# Patient Record
Sex: FEMALE | Race: WHITE | NOT HISPANIC OR LATINO | Employment: FULL TIME | URBAN - METROPOLITAN AREA
[De-identification: names, ages, dates, MRNs, and addresses within clinical notes are randomized per-mention and may not be internally consistent; named-entity substitution may affect disease eponyms.]

---

## 2023-04-21 ENCOUNTER — TELEPHONE (OUTPATIENT)
Dept: PRIMARY CARE | Facility: CLINIC | Age: 33
End: 2023-04-21

## 2023-04-28 ENCOUNTER — TELEMEDICINE (OUTPATIENT)
Dept: PRIMARY CARE | Facility: CLINIC | Age: 33
End: 2023-04-28
Payer: COMMERCIAL

## 2023-04-28 DIAGNOSIS — F32.1 CURRENT MODERATE EPISODE OF MAJOR DEPRESSIVE DISORDER, UNSPECIFIED WHETHER RECURRENT (MULTI): Primary | ICD-10-CM

## 2023-04-28 DIAGNOSIS — F41.1 GENERALIZED ANXIETY DISORDER: ICD-10-CM

## 2023-04-28 PROBLEM — J32.9 CHRONIC SINUSITIS: Status: ACTIVE | Noted: 2023-04-28

## 2023-04-28 PROCEDURE — 99213 OFFICE O/P EST LOW 20 MIN: CPT | Performed by: INTERNAL MEDICINE

## 2023-04-28 RX ORDER — BUPROPION HYDROCHLORIDE 300 MG/1
300 TABLET ORAL EVERY MORNING
Qty: 30 TABLET | Refills: 1 | Status: SHIPPED | OUTPATIENT
Start: 2023-04-28 | End: 2023-06-07 | Stop reason: SDUPTHER

## 2023-04-28 RX ORDER — LEVONORGESTREL 52 MG/1
INTRAUTERINE DEVICE INTRAUTERINE
COMMUNITY
Start: 2019-05-17 | End: 2023-10-06 | Stop reason: ALTCHOICE

## 2023-04-28 RX ORDER — BUPROPION HYDROCHLORIDE 150 MG/1
150 TABLET ORAL EVERY MORNING
Qty: 14 TABLET | Refills: 0 | Status: SHIPPED | OUTPATIENT
Start: 2023-04-28 | End: 2023-06-07 | Stop reason: ALTCHOICE

## 2023-04-28 ASSESSMENT — ENCOUNTER SYMPTOMS
FEVER: 0
PALPITATIONS: 0
DECREASED CONCENTRATION: 1
DIZZINESS: 0
HEADACHES: 0
ACTIVITY CHANGE: 0
CHEST TIGHTNESS: 0
AGITATION: 0
SLEEP DISTURBANCE: 1
FATIGUE: 1
DYSPHORIC MOOD: 1
COUGH: 0
SHORTNESS OF BREATH: 0
NERVOUS/ANXIOUS: 1

## 2023-04-28 NOTE — PROGRESS NOTES
Nathan Jolly requests an appointment today to discuss situational stress.  This has been conducted as a virtual video visit based on the corona virus pandemic.  An interactive audio and video telecommunications system which permits real-time communications between the patient (at home) and provider ([place] [facility] [place] was utilized to provide this telehealth service.  Verbal consent was requested and obtained from the patient at time of scheduling for a telehealth visit.      Ms. Jolly requests a virtual visit to discuss some situational stressors that have worsened.  This has been conducted as a virtual video visit based on the corona virus pandemic.  An interactive audio and video telecommunications system which permits real-time communications between the patient (at home) and provider ([place] [facility] [place] was utilized to provide this telehealth service.  Verbal consent was requested and obtained from the patient at time of scheduling for a telehealth visit.    Ms. Jolly requests a virtual visit today to discuss situational stressors that recently have been worsening.  She states that this comes from a combination of factors.  Pressures at work, some financial stress at home, all of this has been intensifying some.  In the past we had discussed possibly starting on medication therapy to help alleviate situational stressors but this was several months ago and we wanted to touch base just to ensure that we are on the same page of all options.  She has used Lexapro in the past and this has been helpful, but she did have some unwanted side effects, mainly trouble sleeping with troublesome dreams.  She knows that this is a possibility with Wellbutrin as well, but this is some that we have discussed considering at prior visits.        Review of Systems   Constitutional:  Positive for fatigue. Negative for activity change and fever.   Respiratory:  Negative for cough, chest tightness and shortness of breath.     Cardiovascular:  Negative for chest pain and palpitations.   Neurological:  Negative for dizziness and headaches.   Psychiatric/Behavioral:  Positive for decreased concentration, dysphoric mood and sleep disturbance. Negative for agitation, behavioral problems and suicidal ideas. The patient is nervous/anxious.        Objective   Physical Exam  Constitutional:       Appearance: Normal appearance.   Pulmonary:      Effort: Pulmonary effort is normal.   Neurological:      Mental Status: She is alert.   Psychiatric:         Mood and Affect: Mood normal.         Behavior: Behavior normal.         Thought Content: Thought content normal.         Judgment: Judgment normal.         Assessment/Plan   Situational stressors: Agree with starting on medications.  Again discussed different options, mutually deciding again to go with Wellbutrin starting at a 150 mg dose daily, escalating to 300 mg if tolerating well.  She will update us in about 4 to 6 weeks on how she is responding to therapy, and she plans to contact us with any questions or troubles tolerating this.  Problem List Items Addressed This Visit    None

## 2023-04-28 NOTE — PATIENT INSTRUCTIONS
We should plan to start with a low-dose of Wellbutrin/bupropion, 150 mg taken once daily for the initial 2 weeks, followed by an escalating dose to 300 mg daily which would be a maintenance dosing.  I ask that you give us an update on how you are responding to treatment.  If you have any questions regarding this therapy, any troubles tolerating this or unwanted side effects, or any additional concerns, please feel free to contact us.

## 2023-06-07 ENCOUNTER — TELEMEDICINE (OUTPATIENT)
Dept: PRIMARY CARE | Facility: CLINIC | Age: 33
End: 2023-06-07
Payer: COMMERCIAL

## 2023-06-07 DIAGNOSIS — F32.1 CURRENT MODERATE EPISODE OF MAJOR DEPRESSIVE DISORDER, UNSPECIFIED WHETHER RECURRENT (MULTI): Primary | ICD-10-CM

## 2023-06-07 DIAGNOSIS — F41.1 GENERALIZED ANXIETY DISORDER: ICD-10-CM

## 2023-06-07 PROCEDURE — 99213 OFFICE O/P EST LOW 20 MIN: CPT | Performed by: INTERNAL MEDICINE

## 2023-06-07 RX ORDER — BUPROPION HYDROCHLORIDE 300 MG/1
300 TABLET ORAL EVERY MORNING
Qty: 90 TABLET | Refills: 3 | Status: SHIPPED | OUTPATIENT
Start: 2023-06-07 | End: 2023-08-15 | Stop reason: SINTOL

## 2023-06-07 ASSESSMENT — ENCOUNTER SYMPTOMS
CHEST TIGHTNESS: 0
PALPITATIONS: 0
ABDOMINAL PAIN: 0
SHORTNESS OF BREATH: 0
NERVOUS/ANXIOUS: 0
HEADACHES: 0
FATIGUE: 0
NAUSEA: 0
COUGH: 0
ACTIVITY CHANGE: 0
DIZZINESS: 0
WHEEZING: 0
CONSTIPATION: 0
DYSPHORIC MOOD: 0
DIARRHEA: 0
FEVER: 0
SLEEP DISTURBANCE: 0
DECREASED CONCENTRATION: 0

## 2023-06-07 NOTE — PATIENT INSTRUCTIONS
I am glad to hear that you are responding well to the current medications.  We should continue on current dosing.  Refills have been sent to your local pharmacy.  If you have any questions or need additional refills, please let us know.  Otherwise, we are happy to see you back at the time of your routine wellness visit.

## 2023-06-07 NOTE — PROGRESS NOTES
Nathan Jolly has an appointment for a follow up.  This has been conducted as a virtual video visit based on the corona virus pandemic.  An interactive audio and video telecommunications system which permits real-time communications between the patient (at home) and provider (at [facility] [place] [facility]) was utilized to provide this telehealth service.  Verbal consent was requested and obtained from the patient at time of scheduling for a telehealth visit.    Ms. Jolly has a follow-up appointment of medication initiation.  This has been conducted as a virtual video visit based on the corona virus pandemic.  An interactive audio and video telecommunications system which permits real-time communications between the patient (at home) and provider (at [facility] [place] [facility]) was utilized to provide this telehealth service.  Verbal consent was requested and obtained from the patient at time of scheduling for a telehealth visit.    Ms. Jolly has a virtual follow-up appointment of her Wellbutrin initiation.  She finds that this has been quite helpful.  She is tolerating this well.  She has recently increased to the 300 mg dosing and again is tolerating this well.  She has had no troubles with her sleep patterns.  She feels as though the situational stressors are improving and her depression/anxiety is responding well.  She denies any complications with this and would like to continue on current dosing, feeling well overall.        Review of Systems   Constitutional:  Negative for activity change, fatigue and fever.   Respiratory:  Negative for cough, chest tightness, shortness of breath and wheezing.    Cardiovascular:  Negative for chest pain, palpitations and leg swelling.   Gastrointestinal:  Negative for abdominal pain, constipation, diarrhea and nausea.   Neurological:  Negative for dizziness and headaches.   Psychiatric/Behavioral:  Negative for behavioral problems, decreased concentration, dysphoric mood and sleep disturbance. The  patient is not nervous/anxious.        Objective   Physical Exam  Constitutional:       Appearance: Normal appearance.   Pulmonary:      Effort: Pulmonary effort is normal.   Neurological:      Mental Status: She is alert.   Psychiatric:         Mood and Affect: Mood normal.         Behavior: Behavior normal.         Thought Content: Thought content normal.         Judgment: Judgment normal.         Assessment/Plan   1.  Depression with anxiety: Doing well with Wellbutrin 300 mg daily.  Continue on current dosing.  Tolerating well and symptomatically improving.  Refill sent to local pharmacy.  Contact us with any questions or need for additional refills.  See her back as regularly scheduled for her routine wellness visits.    Problem List Items Addressed This Visit       Current moderate episode of major depressive disorder (CMS/HCC) - Primary    Generalized anxiety disorder

## 2023-08-15 ENCOUNTER — OFFICE VISIT (OUTPATIENT)
Dept: PRIMARY CARE | Facility: CLINIC | Age: 33
End: 2023-08-15
Payer: COMMERCIAL

## 2023-08-15 VITALS
DIASTOLIC BLOOD PRESSURE: 67 MMHG | WEIGHT: 215 LBS | BODY MASS INDEX: 40.62 KG/M2 | HEART RATE: 79 BPM | SYSTOLIC BLOOD PRESSURE: 116 MMHG

## 2023-08-15 DIAGNOSIS — F32.1 CURRENT MODERATE EPISODE OF MAJOR DEPRESSIVE DISORDER, UNSPECIFIED WHETHER RECURRENT (MULTI): Primary | ICD-10-CM

## 2023-08-15 DIAGNOSIS — E66.01 CLASS 3 SEVERE OBESITY WITHOUT SERIOUS COMORBIDITY WITH BODY MASS INDEX (BMI) OF 40.0 TO 44.9 IN ADULT, UNSPECIFIED OBESITY TYPE (MULTI): ICD-10-CM

## 2023-08-15 DIAGNOSIS — F41.1 GENERALIZED ANXIETY DISORDER: ICD-10-CM

## 2023-08-15 DIAGNOSIS — R42 VERTIGO: ICD-10-CM

## 2023-08-15 PROBLEM — E66.813 CLASS 3 SEVERE OBESITY WITHOUT SERIOUS COMORBIDITY WITH BODY MASS INDEX (BMI) OF 40.0 TO 44.9 IN ADULT: Status: ACTIVE | Noted: 2023-08-15

## 2023-08-15 PROCEDURE — 1036F TOBACCO NON-USER: CPT | Performed by: INTERNAL MEDICINE

## 2023-08-15 PROCEDURE — 99214 OFFICE O/P EST MOD 30 MIN: CPT | Performed by: INTERNAL MEDICINE

## 2023-08-15 PROCEDURE — 3008F BODY MASS INDEX DOCD: CPT | Performed by: INTERNAL MEDICINE

## 2023-08-15 RX ORDER — SERTRALINE HYDROCHLORIDE 50 MG/1
50 TABLET, FILM COATED ORAL DAILY
Qty: 30 TABLET | Refills: 1 | Status: SHIPPED | OUTPATIENT
Start: 2023-08-15 | End: 2023-09-07 | Stop reason: SDUPTHER

## 2023-08-15 ASSESSMENT — ENCOUNTER SYMPTOMS
DIARRHEA: 0
SHORTNESS OF BREATH: 0
NAUSEA: 0
WHEEZING: 0
DYSPHORIC MOOD: 1
FATIGUE: 0
APPETITE CHANGE: 0
CHEST TIGHTNESS: 0
FEVER: 0
CONSTIPATION: 0
HYPERACTIVE: 0
ACTIVITY CHANGE: 0
WEAKNESS: 0
ABDOMINAL PAIN: 0
PALPITATIONS: 0
DIZZINESS: 1
HEADACHES: 0
COUGH: 0
LIGHT-HEADEDNESS: 0

## 2023-08-15 NOTE — PATIENT INSTRUCTIONS
We will start on sertraline 50 mg tablets.  I would recommend starting with half tablet daily, best taken with food.  After about 1 week if you are tolerating this well you can increase to a full tablet daily.  I would recommend coming in for a comprehensive physical in about 4 to 6 weeks.  At that time we will plan on doing fasting blood work as well is following up on how you are doing with your new medication therapy.  If you have any questions prior to that time, please contact us.

## 2023-08-15 NOTE — PROGRESS NOTES
Nathan Jolly comes in today for a comprehensive follow up.      Nathan comes in today for comprehensive follow-up.  She states that about 1 month ago, she had an intense bout of vertigo.  This resolved quite quickly.  However, she was concerned that this could be a side effect of her Wellbutrin, and she also noticed that she was having some insomnia, only sleeping about 4 or 5 hours per night as well as headaches.  She tapered herself off of the Wellbutrin and now is not taking anything for this, she is concerned about this and would like to start on something else.  She has used Lexapro many years ago which did cause vivid dreams so she discontinued this.  She is struggling with her weight.  She states that this seems to worsen around the time of her menstrual cycle.  She denies any headaches, chest pain or palpitations.        Review of Systems   Constitutional:  Negative for activity change, appetite change, fatigue and fever.   Respiratory:  Negative for cough, chest tightness, shortness of breath and wheezing.    Cardiovascular:  Negative for chest pain, palpitations and leg swelling.   Gastrointestinal:  Negative for abdominal pain, constipation, diarrhea and nausea.   Neurological:  Positive for dizziness (Now resolved). Negative for weakness, light-headedness and headaches.   Psychiatric/Behavioral:  Positive for dysphoric mood. The patient is not hyperactive.        Objective   Physical Exam  Constitutional:       General: She is not in acute distress.     Appearance: She is obese.   HENT:      Right Ear: Tympanic membrane, ear canal and external ear normal. There is no impacted cerumen.      Left Ear: Tympanic membrane and ear canal normal. There is no impacted cerumen.   Neck:      Vascular: No carotid bruit.   Cardiovascular:      Rate and Rhythm: Normal rate and regular rhythm.      Pulses: Normal pulses.      Heart sounds: No murmur heard.     No gallop.   Pulmonary:      Effort: Pulmonary effort is  normal.   Neurological:      Mental Status: She is alert.         Assessment/Plan   1.  Vertigo: This has thankfully resolved.  Exam reassuring.  Contact us if any recurrences.  2.  Depression with anxiety: Did not tolerate Wellbutrin because of insomnia.  We have discussed different options, she is willing to try different SSRI therapy, we will start with sertraline 50 mg tablets, starting with 1/2 tablet daily, increasing to a full tablet daily after about 1 week.  We will follow-up in 4 to 6 weeks to see how she is faring and she can contact us if she has any troubles tolerating this.  3.  Severe obesity: Have discussed.  We will see if her PMS symptoms are managed better with her sertraline therapy.  We will see her back in 4 to 6 weeks and at that time have this scheduled as a comprehensive physical.  We will update comprehensive blood work to see if any medication injectable treatments would be appropriate or options.  She should in the meantime concentrate on dietary adjustments and increasing exercise habits.    Again, we will see her back in 4 to 6 weeks.  She is to contact us with any questions.  Problem List Items Addressed This Visit    None

## 2023-08-16 ENCOUNTER — TELEPHONE (OUTPATIENT)
Dept: PRIMARY CARE | Facility: CLINIC | Age: 33
End: 2023-08-16

## 2023-08-17 NOTE — TELEPHONE ENCOUNTER
Patient notified, states she noticed after she spoke with me she did take an whole dose forgetting to start with 1/2. She will start with half an tablet and see how that goes. If not she will give us an call back. Thanks

## 2023-09-07 DIAGNOSIS — F32.1 CURRENT MODERATE EPISODE OF MAJOR DEPRESSIVE DISORDER, UNSPECIFIED WHETHER RECURRENT (MULTI): ICD-10-CM

## 2023-09-07 DIAGNOSIS — F41.1 GENERALIZED ANXIETY DISORDER: ICD-10-CM

## 2023-09-07 RX ORDER — SERTRALINE HYDROCHLORIDE 50 MG/1
50 TABLET, FILM COATED ORAL DAILY
Qty: 90 TABLET | Refills: 0 | Status: SHIPPED | OUTPATIENT
Start: 2023-09-07 | End: 2023-10-06 | Stop reason: SINTOL

## 2023-10-05 PROBLEM — L30.9 DERMATITIS, UNSPECIFIED: Status: ACTIVE | Noted: 2020-02-20

## 2023-10-05 PROBLEM — L03.032 CELLULITIS OF LEFT TOE: Status: ACTIVE | Noted: 2020-02-20

## 2023-10-05 PROBLEM — E66.812 CLASS 2 SEVERE OBESITY WITH SERIOUS COMORBIDITY AND BODY MASS INDEX (BMI) OF 39.0 TO 39.9 IN ADULT: Status: ACTIVE | Noted: 2023-10-05

## 2023-10-05 PROBLEM — E66.01 CLASS 2 SEVERE OBESITY WITH SERIOUS COMORBIDITY AND BODY MASS INDEX (BMI) OF 39.0 TO 39.9 IN ADULT (MULTI): Status: ACTIVE | Noted: 2023-10-05

## 2023-10-05 RX ORDER — NORETHINDRONE AND ETHINYL ESTRADIOL 1 MG-35MCG
1 KIT ORAL DAILY
COMMUNITY
End: 2023-10-06 | Stop reason: SDUPTHER

## 2023-10-05 RX ORDER — TRIAMCINOLONE ACETONIDE 1 MG/G
1 CREAM TOPICAL
COMMUNITY
Start: 2020-02-20 | End: 2023-10-06 | Stop reason: ALTCHOICE

## 2023-10-05 RX ORDER — NORETHINDRONE AND ETHINYL ESTRADIOL 1 MG-35MCG
1 KIT ORAL DAILY
COMMUNITY
End: 2023-10-06 | Stop reason: ALTCHOICE

## 2023-10-05 RX ORDER — MUPIROCIN 20 MG/G
1 OINTMENT TOPICAL
COMMUNITY
Start: 2020-02-20 | End: 2023-10-06 | Stop reason: ALTCHOICE

## 2023-10-05 RX ORDER — KETOCONAZOLE 20 MG/G
1 CREAM TOPICAL
COMMUNITY
Start: 2020-02-20 | End: 2023-10-06 | Stop reason: ALTCHOICE

## 2023-10-06 ENCOUNTER — PROCEDURE VISIT (OUTPATIENT)
Dept: OBSTETRICS AND GYNECOLOGY | Facility: CLINIC | Age: 33
End: 2023-10-06
Payer: COMMERCIAL

## 2023-10-06 ENCOUNTER — OFFICE VISIT (OUTPATIENT)
Dept: PRIMARY CARE | Facility: CLINIC | Age: 33
End: 2023-10-06
Payer: COMMERCIAL

## 2023-10-06 VITALS
HEART RATE: 75 BPM | WEIGHT: 214.4 LBS | SYSTOLIC BLOOD PRESSURE: 142 MMHG | BODY MASS INDEX: 40.48 KG/M2 | DIASTOLIC BLOOD PRESSURE: 82 MMHG | HEIGHT: 61 IN

## 2023-10-06 VITALS
WEIGHT: 215 LBS | HEIGHT: 61 IN | BODY MASS INDEX: 40.59 KG/M2 | DIASTOLIC BLOOD PRESSURE: 80 MMHG | SYSTOLIC BLOOD PRESSURE: 126 MMHG

## 2023-10-06 DIAGNOSIS — Z23 NEED FOR INFLUENZA VACCINATION: ICD-10-CM

## 2023-10-06 DIAGNOSIS — Z00.00 ROUTINE GENERAL MEDICAL EXAMINATION AT A HEALTH CARE FACILITY: Primary | ICD-10-CM

## 2023-10-06 DIAGNOSIS — Z01.419 ENCNTR FOR GYN EXAM (GENERAL) (ROUTINE) W/O ABN FINDINGS: Primary | ICD-10-CM

## 2023-10-06 DIAGNOSIS — Z12.4 SCREENING FOR MALIGNANT NEOPLASM OF CERVIX: ICD-10-CM

## 2023-10-06 DIAGNOSIS — Z30.432 ENCOUNTER FOR IUD REMOVAL: ICD-10-CM

## 2023-10-06 PROBLEM — L30.9 DERMATITIS, UNSPECIFIED: Status: RESOLVED | Noted: 2020-02-20 | Resolved: 2023-10-06

## 2023-10-06 PROBLEM — L03.032 CELLULITIS OF LEFT TOE: Status: RESOLVED | Noted: 2020-02-20 | Resolved: 2023-10-06

## 2023-10-06 PROBLEM — E66.01 CLASS 2 SEVERE OBESITY WITH SERIOUS COMORBIDITY AND BODY MASS INDEX (BMI) OF 39.0 TO 39.9 IN ADULT (MULTI): Status: RESOLVED | Noted: 2023-10-05 | Resolved: 2023-10-06

## 2023-10-06 PROBLEM — E66.812 CLASS 2 SEVERE OBESITY WITH SERIOUS COMORBIDITY AND BODY MASS INDEX (BMI) OF 39.0 TO 39.9 IN ADULT: Status: RESOLVED | Noted: 2023-10-05 | Resolved: 2023-10-06

## 2023-10-06 LAB
25(OH)D3 SERPL-MCNC: 27 NG/ML (ref 30–100)
ALBUMIN SERPL BCP-MCNC: 4.1 G/DL (ref 3.4–5)
ALP SERPL-CCNC: 48 U/L (ref 33–110)
ALT SERPL W P-5'-P-CCNC: 17 U/L (ref 7–45)
ANION GAP SERPL CALC-SCNC: 14 MMOL/L (ref 10–20)
AST SERPL W P-5'-P-CCNC: 16 U/L (ref 9–39)
BASOPHILS # BLD AUTO: 0.02 X10*3/UL (ref 0–0.1)
BASOPHILS NFR BLD AUTO: 0.3 %
BILIRUB SERPL-MCNC: 0.3 MG/DL (ref 0–1.2)
BUN SERPL-MCNC: 11 MG/DL (ref 6–23)
CALCIUM SERPL-MCNC: 9.2 MG/DL (ref 8.6–10.6)
CHLORIDE SERPL-SCNC: 108 MMOL/L (ref 98–107)
CHOLEST SERPL-MCNC: 188 MG/DL (ref 0–199)
CHOLESTEROL/HDL RATIO: 3.9
CO2 SERPL-SCNC: 24 MMOL/L (ref 21–32)
CREAT SERPL-MCNC: 0.77 MG/DL (ref 0.5–1.05)
EOSINOPHIL # BLD AUTO: 0.16 X10*3/UL (ref 0–0.7)
EOSINOPHIL NFR BLD AUTO: 2.8 %
ERYTHROCYTE [DISTWIDTH] IN BLOOD BY AUTOMATED COUNT: 12.1 % (ref 11.5–14.5)
EST. AVERAGE GLUCOSE BLD GHB EST-MCNC: 97 MG/DL
GFR SERPL CREATININE-BSD FRML MDRD: >90 ML/MIN/1.73M*2
GLUCOSE SERPL-MCNC: 87 MG/DL (ref 74–99)
HBA1C MFR BLD: 5 %
HCT VFR BLD AUTO: 42.5 % (ref 36–46)
HDLC SERPL-MCNC: 48.5 MG/DL
HGB BLD-MCNC: 13.3 G/DL (ref 12–16)
IMM GRANULOCYTES # BLD AUTO: 0.01 X10*3/UL (ref 0–0.7)
IMM GRANULOCYTES NFR BLD AUTO: 0.2 % (ref 0–0.9)
IRON SATN MFR SERPL: 16 % (ref 25–45)
IRON SERPL-MCNC: 58 UG/DL (ref 35–150)
LDLC SERPL CALC-MCNC: 117 MG/DL (ref 130–180)
LYMPHOCYTES # BLD AUTO: 2.35 X10*3/UL (ref 1.2–4.8)
LYMPHOCYTES NFR BLD AUTO: 41.1 %
MCH RBC QN AUTO: 27.7 PG (ref 26–34)
MCHC RBC AUTO-ENTMCNC: 31.3 G/DL (ref 32–36)
MCV RBC AUTO: 88 FL (ref 80–100)
MONOCYTES # BLD AUTO: 0.36 X10*3/UL (ref 0.1–1)
MONOCYTES NFR BLD AUTO: 6.3 %
NEUTROPHILS # BLD AUTO: 2.82 X10*3/UL (ref 1.2–7.7)
NEUTROPHILS NFR BLD AUTO: 49.3 %
NON HDL CHOLESTEROL: 140 MG/DL (ref 0–149)
NRBC BLD-RTO: 0 /100 WBCS (ref 0–0)
PLATELET # BLD AUTO: 283 X10*3/UL (ref 150–450)
PMV BLD AUTO: 11 FL (ref 7.5–11.5)
POTASSIUM SERPL-SCNC: 4.4 MMOL/L (ref 3.5–5.3)
PROT SERPL-MCNC: 6.8 G/DL (ref 6.4–8.2)
RBC # BLD AUTO: 4.81 X10*6/UL (ref 4–5.2)
SODIUM SERPL-SCNC: 142 MMOL/L (ref 136–145)
TIBC SERPL-MCNC: 356 UG/DL (ref 240–445)
TRIGL SERPL-MCNC: 115 MG/DL (ref 0–149)
TSH SERPL-ACNC: 1.33 MIU/L (ref 0.44–3.98)
UIBC SERPL-MCNC: 298 UG/DL (ref 110–370)
VIT B12 SERPL-MCNC: 710 PG/ML (ref 211–911)
VLDL: 23 MG/DL (ref 0–40)
WBC # BLD AUTO: 5.7 X10*3/UL (ref 4.4–11.3)

## 2023-10-06 PROCEDURE — 99395 PREV VISIT EST AGE 18-39: CPT | Performed by: INTERNAL MEDICINE

## 2023-10-06 PROCEDURE — 83550 IRON BINDING TEST: CPT

## 2023-10-06 PROCEDURE — 90471 IMMUNIZATION ADMIN: CPT | Performed by: INTERNAL MEDICINE

## 2023-10-06 PROCEDURE — 1036F TOBACCO NON-USER: CPT | Performed by: INTERNAL MEDICINE

## 2023-10-06 PROCEDURE — 36415 COLL VENOUS BLD VENIPUNCTURE: CPT

## 2023-10-06 PROCEDURE — 83036 HEMOGLOBIN GLYCOSYLATED A1C: CPT

## 2023-10-06 PROCEDURE — 80053 COMPREHEN METABOLIC PANEL: CPT

## 2023-10-06 PROCEDURE — 88142 CYTOPATH C/V THIN LAYER: CPT

## 2023-10-06 PROCEDURE — 85025 COMPLETE CBC W/AUTO DIFF WBC: CPT

## 2023-10-06 PROCEDURE — 82306 VITAMIN D 25 HYDROXY: CPT

## 2023-10-06 PROCEDURE — 82607 VITAMIN B-12: CPT

## 2023-10-06 PROCEDURE — 87624 HPV HI-RISK TYP POOLED RSLT: CPT

## 2023-10-06 PROCEDURE — 83540 ASSAY OF IRON: CPT

## 2023-10-06 PROCEDURE — 99395 PREV VISIT EST AGE 18-39: CPT | Performed by: NURSE PRACTITIONER

## 2023-10-06 PROCEDURE — 3008F BODY MASS INDEX DOCD: CPT | Performed by: INTERNAL MEDICINE

## 2023-10-06 PROCEDURE — 84443 ASSAY THYROID STIM HORMONE: CPT

## 2023-10-06 PROCEDURE — 58301 REMOVE INTRAUTERINE DEVICE: CPT | Performed by: NURSE PRACTITIONER

## 2023-10-06 PROCEDURE — 80061 LIPID PANEL: CPT

## 2023-10-06 PROCEDURE — 90686 IIV4 VACC NO PRSV 0.5 ML IM: CPT | Performed by: INTERNAL MEDICINE

## 2023-10-06 ASSESSMENT — ENCOUNTER SYMPTOMS
FREQUENCY: 0
NECK STIFFNESS: 0
LIGHT-HEADEDNESS: 0
ARTHRALGIAS: 0
FLANK PAIN: 0
EYE ITCHING: 0
EYE DISCHARGE: 0
SINUS PAIN: 0
ABDOMINAL DISTENTION: 0
CONSTIPATION: 0
DIZZINESS: 0
DECREASED CONCENTRATION: 0
FEVER: 0
BRUISES/BLEEDS EASILY: 0
ACTIVITY CHANGE: 0
ABDOMINAL PAIN: 0
ADENOPATHY: 0
BACK PAIN: 0
FATIGUE: 1
COUGH: 0
POLYPHAGIA: 0
NECK PAIN: 0
CHILLS: 0
VOMITING: 0
VOICE CHANGE: 0
UNEXPECTED WEIGHT CHANGE: 0
DYSPHORIC MOOD: 1
DIARRHEA: 0
MYALGIAS: 0
NAUSEA: 0
NUMBNESS: 0
DYSURIA: 0
RHINORRHEA: 0
CHEST TIGHTNESS: 0
HEADACHES: 0
SLEEP DISTURBANCE: 0
HYPERACTIVE: 0
PALPITATIONS: 0
WEAKNESS: 0
SINUS PRESSURE: 0
WHEEZING: 0
COLOR CHANGE: 0
SHORTNESS OF BREATH: 0
SORE THROAT: 0

## 2023-10-06 NOTE — PROGRESS NOTES
"Nathan Jolly is a 32 y.o. who presents today for her annual gynecologic exam without complaints    The patient is sexually active. With   Concerns with intercourse:  no   Current contraception: IUD    Last pap:   2018 Normal HPV Negative  History of abnormal pap: no  HPV vaccine:   Last mammogram: Never  Colon screen: Never    Pregnancy hx:    OB History          3    Para   3    Term   3       0    AB   0    Living   3         SAB   0    IAB   0    Ectopic   0    Multiple   0    Live Births   3                  History of STIs: no    Patient concern for STI: no    Family history of breast, uterine, ovarian or colon cancer: no     Past medical, surgical, family and social histories reviewed and updated as needed.      /80   Ht 1.549 m (5' 1\")   Wt 97.5 kg (215 lb)   LMP 2023 (Exact Date)   BMI 40.62 kg/m²      Physical Exam  Constitutional:       Appearance: Normal appearance.   Genitourinary:      Bladder and rectum normal.      Right Labia: No skin changes or Bartholin's cyst.     Left Labia: No skin changes or Bartholin's cyst.     Vulva exam comments: Normal.      No vaginal prolapse present.     No vaginal atrophy present.     Vaginal exam comments: Normal.      No cervical motion tenderness.      IUD strings visualized.      Cervical exam comments: Normal.   Breasts:     Breasts are soft.     Right: Normal.      Left: Normal.   HENT:      Head: Normocephalic.   Pulmonary:      Effort: Pulmonary effort is normal.   Abdominal:      General: There is no distension.      Palpations: Abdomen is soft.   Musculoskeletal:         General: Normal range of motion.      Cervical back: Normal range of motion and neck supple.   Neurological:      General: No focal deficit present.      Mental Status: She is alert and oriented to person, place, and time.   Skin:     General: Skin is warm and dry.   Psychiatric:         Mood and Affect: Mood normal.         Behavior: Behavior normal.    "      Thought Content: Thought content normal.         Judgment: Judgment normal.   Vitals and nursing note reviewed.        Patient ID: Nathan Jolly is a 32 y.o. female.    IUD Removal    Date/Time: 10/6/2023 11:21 AM    Performed by: JOSE C Groves  Authorized by: JOSE C Groves    Consent:     Consent obtained:  Written    Consent given by:  Patient    Procedure risks and benefits discussed: yes      Patient questions answered: yes      Patient agrees, verbalizes understanding, and wants to proceed: yes      Educational handouts given: yes      Instructions and paperwork completed: yes    Universal protocol:     Patient states understanding of procedure being performed: yes      Relevant documents present and verified: yes      Test results available and properly labeled: yes      Imaging studies available: yes      Required blood products, implants, devices, and special equipment available: yes      Site marked: yes    Procedure:     Removed with no complications: yes      Removal due to mechanical complications of IUD: no      Removal due to infection and inflammatory reaction: no      Other reason for removal:  Desires pregnancy      Diagnoses and all orders for this visit:  Encntr for gyn exam (general) (routine) w/o abn findings  Encounter for IUD removal  -     IUD Removal  Screening for malignant neoplasm of cervix  -     THINPREP PAP

## 2023-10-06 NOTE — PROGRESS NOTES
Nathan Jolly comes in today for a comprehensive physical exam.      Ms Jolly comes in today for a comprehensive physical exam.  Unfortunately, she was not tolerating her sertraline therapy, so has discontinued this as well.  She has tried and failed SSRIs, Wellbutrin, and understands that possibly setting up with a mental health provider would be the next best step for further medication options.  She is following with her gynecologist later today and would like her IUD removed as she believes maybe this is causing some of her symptoms.  She has tried to eliminate sugars and carbohydrates over the past week, she is frustrated because she has not lost weight yet.  She admits that she does not exercise, preferring to lay in bed and play on her phone.  She does admit that life is quite hectic and she does feel tired, not motivated to exercise.  She denies any specific concerns of headaches, dizziness, chest pain, palpitations, shortness of breath nor cough, nausea, vomiting, nor changes in bowel or bladder habits.  She is amenable to updating comprehensive lab work today.        Review of Systems   Constitutional:  Positive for fatigue. Negative for activity change, chills, fever and unexpected weight change.   HENT:  Negative for congestion, ear pain, hearing loss, postnasal drip, rhinorrhea, sinus pressure, sinus pain, sore throat, tinnitus and voice change.    Eyes:  Negative for discharge, itching and visual disturbance.   Respiratory:  Negative for cough, chest tightness, shortness of breath and wheezing.    Cardiovascular:  Negative for chest pain, palpitations and leg swelling.   Gastrointestinal:  Negative for abdominal distention, abdominal pain, constipation, diarrhea, nausea and vomiting.   Endocrine: Negative for cold intolerance, polyphagia and polyuria.   Genitourinary:  Negative for dysuria, flank pain, frequency, menstrual problem, urgency and vaginal discharge.   Musculoskeletal:  Negative for  arthralgias, back pain, gait problem, myalgias, neck pain and neck stiffness.   Skin:  Negative for color change, pallor and rash.   Allergic/Immunologic: Negative for environmental allergies, food allergies and immunocompromised state.   Neurological:  Negative for dizziness, syncope, weakness, light-headedness, numbness and headaches.   Hematological:  Negative for adenopathy. Does not bruise/bleed easily.   Psychiatric/Behavioral:  Positive for dysphoric mood. Negative for behavioral problems, decreased concentration and sleep disturbance. The patient is not hyperactive.        Objective   Physical Exam  Constitutional:       General: She is not in acute distress.     Appearance: Normal appearance. She is well-developed. She is obese. She is not ill-appearing.   HENT:      Head: Normocephalic.      Right Ear: Tympanic membrane, ear canal and external ear normal. There is no impacted cerumen.      Left Ear: Tympanic membrane, ear canal and external ear normal. There is no impacted cerumen.      Nose: Nose normal.      Mouth/Throat:      Mouth: Mucous membranes are moist.      Pharynx: Oropharynx is clear. No oropharyngeal exudate or posterior oropharyngeal erythema.   Eyes:      General: Lids are normal. No scleral icterus.     Extraocular Movements: Extraocular movements intact.      Conjunctiva/sclera: Conjunctivae normal.      Pupils: Pupils are equal, round, and reactive to light.   Neck:      Vascular: No carotid bruit.   Cardiovascular:      Rate and Rhythm: Normal rate and regular rhythm.      Pulses: Normal pulses.      Heart sounds: No murmur heard.  Pulmonary:      Effort: Pulmonary effort is normal. No respiratory distress.      Breath sounds: No wheezing, rhonchi or rales.   Chest:      Comments: Deferred to gynecology  Abdominal:      General: Bowel sounds are normal. There is no distension.      Palpations: Abdomen is soft. There is no mass.      Tenderness: There is no abdominal tenderness. There is  no guarding.   Genitourinary:     Comments: Deferred to gynecology  Musculoskeletal:         General: No swelling or signs of injury.      Cervical back: Normal range of motion and neck supple. No tenderness.      Right lower leg: No edema.      Left lower leg: No edema.   Lymphadenopathy:      Cervical: No cervical adenopathy.   Skin:     General: Skin is warm and dry.      Coloration: Skin is not pale.      Findings: No bruising or rash.   Neurological:      General: No focal deficit present.      Mental Status: She is alert and oriented to person, place, and time.      Cranial Nerves: No cranial nerve deficit.      Motor: No weakness.      Gait: Gait normal.   Psychiatric:         Mood and Affect: Mood normal.         Behavior: Behavior normal.         Judgment: Judgment normal.         Assessment/Plan   Full age-appropriate comprehensive physical exam and health care maintenance performed and discussed today.  Routine safety and preventative measures discussed including self breast exam, seatbelt use, no drinking and driving, no texting and driving, abstinence or cessation of tobacco use, routine dental and vision exams, healthy diet and regular exercise.    We will update comprehensive labs and follow-up on results once available.  Gynecologic exam later today.  All other screening will start age-appropriate times in the future.  Tetanus up-to-date from 2018.  Flu shot provided today.  Recommend keeping up with COVID boosters.    Agree with referral to psychiatry specialist.  She will check her provider network but also we will place a referral through the  system.  She is inquiring about injectable medications for weight loss, we have discussed that these are not covered as weight loss aids, only for diabetes.  She will look whether Wegovy or Saxenda are covered by her insurance plan.  Have continue to encourage healthy lifestyle choices and increasing exercise habits.  She will contact us with any  questions.  Otherwise, we are happy to see her annually.  Problem List Items Addressed This Visit    None  Visit Diagnoses       Need for influenza vaccination    -  Primary    Relevant Orders    Flu vaccine (IIV4) age 6 months and greater, preservative free (Completed)

## 2023-10-06 NOTE — PROGRESS NOTES
Patient in office today for IUD removal and to talk about family planning .          Shalini Serrato CMA

## 2023-10-06 NOTE — PATIENT INSTRUCTIONS
We will follow up on all comprehensive blood work once results are available and make any recommendations based on these results as may be indicated.  Please continue with routine gynecologic exams.  All other screening will start age-appropriate times in the future.  Tetanus vaccine is up-to-date from 2018, recommended every 10 years.  Thank you for receiving your flu shot and please consider keeping up with COVID boosters.  I have placed a referral to a mental health provider.  You can also check with your insurance provider list to see if there are any providers in the private sector that are covered.  If you have any questions, please feel free to contact us.  Otherwise, we are happy to see you annually for your wellness visits.

## 2023-10-16 LAB
CYTOLOGY CMNT CVX/VAG CYTO-IMP: NORMAL
HPV HR GENOTYPES PNL CVX NAA+PROBE: NEGATIVE
HPV HR GENOTYPES PNL CVX NAA+PROBE: NEGATIVE
HPV16 DNA SPEC QL NAA+PROBE: NEGATIVE
HPV18 DNA SPEC QL NAA+PROBE: NEGATIVE
LAB AP HPV GENOTYPE QUESTION: YES
LAB AP HPV HR: NORMAL
LABORATORY COMMENT REPORT: NORMAL
LABORATORY COMMENT REPORT: NORMAL
LMP START DATE: NORMAL
PATH REPORT.TOTAL CANCER: NORMAL

## 2024-02-29 ENCOUNTER — INITIAL PRENATAL (OUTPATIENT)
Dept: OBSTETRICS AND GYNECOLOGY | Facility: CLINIC | Age: 34
End: 2024-02-29
Payer: COMMERCIAL

## 2024-02-29 VITALS — SYSTOLIC BLOOD PRESSURE: 118 MMHG | WEIGHT: 209.2 LBS | DIASTOLIC BLOOD PRESSURE: 80 MMHG | BODY MASS INDEX: 39.53 KG/M2

## 2024-02-29 DIAGNOSIS — Z3A.08 8 WEEKS GESTATION OF PREGNANCY (HHS-HCC): Primary | ICD-10-CM

## 2024-02-29 DIAGNOSIS — E04.9 ENLARGED THYROID: ICD-10-CM

## 2024-02-29 PROCEDURE — 87086 URINE CULTURE/COLONY COUNT: CPT

## 2024-02-29 PROCEDURE — 87800 DETECT AGNT MULT DNA DIREC: CPT

## 2024-02-29 PROCEDURE — 99214 OFFICE O/P EST MOD 30 MIN: CPT

## 2024-02-29 ASSESSMENT — EDINBURGH POSTNATAL DEPRESSION SCALE (EPDS)
THE THOUGHT OF HARMING MYSELF HAS OCCURRED TO ME: NEVER
THINGS HAVE BEEN GETTING ON TOP OF ME: NO, I HAVE BEEN COPING AS WELL AS EVER
I HAVE LOOKED FORWARD WITH ENJOYMENT TO THINGS: AS MUCH AS I EVER DID
I HAVE BEEN SO UNHAPPY THAT I HAVE HAD DIFFICULTY SLEEPING: NOT AT ALL
I HAVE BEEN ANXIOUS OR WORRIED FOR NO GOOD REASON: NO, NOT AT ALL
I HAVE FELT SAD OR MISERABLE: NO, NOT AT ALL
TOTAL SCORE: 0
I HAVE BLAMED MYSELF UNNECESSARILY WHEN THINGS WENT WRONG: NO, NEVER
I HAVE BEEN SO UNHAPPY THAT I HAVE BEEN CRYING: NO, NEVER
I HAVE FELT SCARED OR PANICKY FOR NO GOOD REASON: NO, NOT AT ALL
I HAVE BEEN ABLE TO LAUGH AND SEE THE FUNNY SIDE OF THINGS: AS MUCH AS I ALWAYS COULD

## 2024-02-29 ASSESSMENT — ENCOUNTER SYMPTOMS: NAUSEA: 1

## 2024-02-29 NOTE — PROGRESS NOTES
Assessment   Diagnoses and all orders for this visit:  8 weeks gestation of pregnancy  -     US MAC OB imaging order; Future  -     CBC Anemia Panel With Reflex,Pregnancy; Future  -     HEMOGLOBIN IDENTIFICATION WITH PATH REVIEW; Future  -     Hepatitis B surface antigen; Future  -     Hepatitis C antibody; Future  -     Rubella Antibody, IgG; Future  -     Syphilis Screen with Reflex; Future  -     HIV 1/2 Antigen/Antibody Screen with Reflex to Confirmation; Future  -     Varicella Zoster Antibody, IgG; Future  -     Urine Culture  -     C. Trachomatis / N. Gonorrhoeae, Amplified Detection  -     Hemoglobin A1C; Future  Enlarged thyroid  -     TSH with reflex to Free T4 if abnormal; Future      Plan   NOB plan: New OB resources provided and reviewed with particular attention to dietary, travel, and medication restrictions  Oriented to practice, CNM vs. MD care  Reviewed bleeding precautions, warning signs, when to call provider; phone number provided  Routine NOB labs ordered  Discussed Centering Pregnancy with patient, declines at this time.   Dating ultrasound ordered  Return in 4 weeks for routine prenatal care     Pamella Mayers, ISABELLE-CNNINA    Subjective   Nathan Jolly is a 33 y.o.  at 8w5d with a working estimated date of delivery of 10/5/2024, by Last Menstrual Period who presents for an initial prenatal visit.  Patient reports that she had beta shots in her last pregnancy for consistant cramping.  Patient currently experiencing: nausea, declines anti-emetics, nausea is worse in the evenings  Bleeding or cramping since LMP: yes - some mild cramping on and off.    Taking prenatal vitamin: Yes  Ultrasound completed this pregnancy: No  Last pap: 10;/2023    OB History    Para Term  AB Living   4 3 3 0 0 3   SAB IAB Ectopic Multiple Live Births   0 0 0 0 3      # Outcome Date GA Lbr Bradford/2nd Weight Sex Delivery Anes PTL Lv   4 Current            3 Term 18 40w0d  3.232 kg F Vag-Spont EPI N  LIN   2 Term 11/27/15 40w0d  3.232 kg M Vag-Spont EPI N LIN      Complications: Placenta, retained   1 Term 13 39w0d  3.232 kg M Vag-Spont EPI N LIN      Complications: Placenta, retained     [place]  Depression Scale Total: 0  Prior pregnancy complications: Patient has a history of an elevated 1-hour.   History of hypertension:  No    Past Medical History:   Diagnosis Date    Cellulitis of left toe 2020    Elevated glucose tolerance test     Irregular menstrual cycle     IUD (intrauterine device) in place     Parity 3     Plantar fasciitis     Postpartum depression     Retained placenta     Retained placenta without hemorrhage     Retained placenta      Past Surgical History:   Procedure Laterality Date    DILATION AND CURETTAGE OF UTERUS  2015    Dilation And Curettage      Social History     Socioeconomic History    Marital status:      Spouse name: None    Number of children: None    Years of education: None    Highest education level: None   Occupational History    Occupation: RN     Employer: [employer]     Comment: Informatics   Tobacco Use    Smoking status: Never    Smokeless tobacco: Never   Vaping Use    Vaping Use: Never used   Substance and Sexual Activity    Alcohol use: Never    Drug use: Never    Sexual activity: Yes     Partners: Male     Birth control/protection: I.U.D.   Other Topics Concern    None   Social History Narrative    None     Social Determinants of Health     Financial Resource Strain: Not on file   Food Insecurity: Not on file   Transportation Needs: Not on file   Physical Activity: Not on file   Stress: Not on file   Social Connections: Not on file   Intimate Partner Violence: Not on file        Objective   Physical Exam  Weight: 94.9 kg (209 lb 3.2 oz)  TWG: Not found.   Expected Total Weight Gain: Could not be calculated   Pregravid BMI: Could not be calculated  BP: 118/80    Review of Systems   Gastrointestinal:  Positive for  nausea.   All other systems reviewed and are negative.      Physical Exam  Constitutional:       Appearance: Normal appearance. She is obese.   Neck:      Thyroid: No thyroid mass or thyroid tenderness.   Cardiovascular:      Rate and Rhythm: Normal rate and regular rhythm.      Heart sounds: Normal heart sounds.   Pulmonary:      Effort: Pulmonary effort is normal.      Breath sounds: Normal breath sounds.   Abdominal:      Palpations: Abdomen is soft.   Neurological:      Mental Status: She is alert.   Skin:     General: Skin is warm and dry.   Psychiatric:         Mood and Affect: Mood normal.         Behavior: Behavior normal.         Thought Content: Thought content normal.         Judgment: Judgment normal.        Patient declines pelvic.   Heart lungs and abdomen.   No breast.     Postpartum Depression: Low Risk  (2024)    [place]  Depression Scale     Last EPDS Total Score: 0     Last EPDS Self Harm Result: Never        Pregnancy Problems (from 24 to present)       No problems associated with this episode.               Important in pregnancy    Avoidance of alcohol, tobacco and drug use   Dietary restrictions reviewed including avoiding raw or poorly cooked meat, lunch meat and soft cheeses  3.    Adequate water intake.  Avoid empty calories with juices  4.    Recommendation for weight gain based on initial BMI (body mass index)  5.    Limit caffeine to less than 200-300 mg/day  6.    Take folic acid 400 mcg daily.  Incorporate 5,000u Vitamin D3 per day.  Discuss Magnesium Supplementation  7.    Importance of good sleep hygiene and avoidance of laying on back after 15 weeks  8.    Encourage daily physical activity of 30 minutes a day the majority of the days of the week  9.    Discussed normal physiologic changes:  Round ligament pain, nausea, breast tenderness  10.  Discussed natural remedies, vitamins and prescription medications for nausea  11.  Baby aspirin 162mg daily (two baby  aspirin) for the reduction of pre-eclampsia during pregnancy.  Even if you have          never had any blood pressure issues, you can develop hypertension during your pregnancy.  This has been well          Studied and safe to take starting at 12 weeks gestation until after the birth of your baby.     **IF AT ANYTIME DURING YOUR PREGNANCY YOU HAVE CONCERNS THAT YOU CANNOT AFFORD HEALTHY FOOD PLEASE LET US KNOW!**  We have a Food for Life program and would be happy to place a referral for you.  It is so important to eat healthy during your pregnancy and we treat food as medicine.  Healthy food is expensive!  This program will allow you and your family up to 4 to receive food and recipes for one week per month.  This needs to be renewed every 6 months.     Ultrasound and screening for aneuploidies (Down Syndrome/Trisomy 21, Trisomy 13 + 18)  A requisition has been placed for a dating ultrasound.  Please call to get that scheduled.    At this ultrasound they will ask you if your would like to proceed with screening for genetic disorders that are listed above.  They will do that with an ultrasound at approximately 13 weeks and we also draw blood work for screening which includes the fetal sex if you desire to know.     Ultrasound for anatomy will be done at 19 weeks.  Based on risk factors and any concerns the maternal fetal medicine provider has, you may need a repeat ultrasound.  Healthy pregnancies that do not have any other concerns by the midwife or maternal fetal medicine do not have any repeat ultrasounds done.     Labs:   An order will be placed for your new ob labs.  Please get those done at the time of your ultrasound.  They will collect          multiple tubes of blood for new ob labs and also urine for STI testing and a urine culture.   If there are any concerns with any blood work or urine testing WE WILL CALL YOU OR COMMUNICATE VIA          [employer]!!!   The biggest concerns our patients have is when they  "see their complete blood count.  The reference          range in the result is for a non pregnant person!  We will notify you if there is any need to start an iron supplement.  3.    At 26-28 weeks a glucose test is ordered to see if you have gestational diabetes.  We also reassess if you have          Anemia, which can be common in pregnancy  4.    Group B strep culture will be done at 36 weeks gestation.     We also recommend that you be screened once in your life for Cystic Fibrosis and Spinal Muscular Atrophy.  This assesses if we need your partner to be tested if you are a carrier of a gene that can be passed along to your baby.  For this to happen, both parents must be a carrier to the gene.     Choices for care and hospital for birth:  I am a Certified Nurse Midwife and practice in a group setting, which means that any of the midwives in my group practice may be there for your birth.  We care for healthy females during pregnancy and labor/birth.  We practice in collaboration with physicians within our group.  If there are any concerns with your pregnancy, labor or birth our physicians work closely with us.       The midwives in our practice strongly encourage you to explore the option of Centering Pregnancy which has been studied for better birth outcomes!  Care will be done in a group setting with 1:1 time with a midwife and then in depth education about every stage of your pregnancy, labor/birth,  care, feeding choices, pediatrician options, birth control and coping techniques for the first few weeks after birth.  We have day groups and our [place] location and a Monday evening group at [facility].  The groups follow your normal prenatal schedule and yes, we keep in contact and I see you at the end of your pregnancy.     There are certain medical conditions that \"risks you out\" of midwifery care that we are constantly assessing for.  Some conditions during your pregnancy that would risk you out of " midwifery care would be:   Severe growth restriction of your baby   Labor/Birth  less than 35 weeks   Severe pre-eclampsia at any time during your pregnancy/labor/birth   Gestational Diabetes needing medication (insulin) to control your blood sugars   If you decline or do not complete your glucose testing to rule out diet controlled diabetes by 32 weeks   If you are diagnosed with chronic hypertension during your pregnancy and need to start medication     The options for birth where we provide / Certified Nurse Midwifery coverage with a board certified OB/GYN, in house anesthesia and neonataology coverage are:     [facility] at [place], [place]     To call for questions regarding your care of if you are in labor is [phone]  My nurses name is Rita Tobin who can answer questions and keep me updated should any questions or concerns arise during your pregnancy.     After hours, the answering service will ask you where you intended to give birth and connect you to the midwife on call at [facility] or the [facility].     If you would like to tour either facility:  Please call the Childbirth education line at [phone]     Danger signs to report:  Seek medical care immediately if you have pain that is doubling you over or vaginal bleeding that is heavier than a  period  Notify the office should you have any burning, urgency, frequency of urination or other concerning symptoms.     Medications that are safe for common discomforts of pregnancy:   Tylenol   Tums or Papaya extract for any upset stomach or heartburn   Zyrtec, Claritin, Benadryl for allergy symptoms   Sudafed or Robitussin for cold symptoms  [employer] is an quintin that is great for what medications are safe to take throughout your pregnancy and breastfeeding journey through the first year of life!  Well worth the $3.99     Work  "restrictions:  A normal healthy pregnancy without any complications are able to have the standard pregnancy work restrictions which is no pushing/pulling/lifting greater than 25 pounds independently     FMLA paperwork  Can be brought to the office for us to fill out for when you are starting your maternity leave (either your scheduled date of going to the hospital or your due date).  We cannot give out early FMLA when there is no documented medical conditions that are considered \"normal pregnancy\" events.     Comfort measures   Chiropractors are great for alleviation of ligament pain   Yoga is good for your ligaments and mentally preparing for baby and labor.  A prenatal yoga class is recommended.  3.     Prenatal massages are fine  4.     A maternity support belt is an amazing thing that can help ligament pain -- can be purchased on Amazon  5.     Good supportive shoes are key to helping with ligament pain     Dental care  It is very important to see a dentist during your pregnancy for routine cleaning and also if you develop any dental pain during your pregnancy.  Healthy gums and teeth are very important during your pregnancy.  We can provide you with a dental letter if your dentist would like one.     Thank you for choosing our practice and [facility] for you healthcare!  I am excited to partner with you during this very special time!      If there is anything I can do to help make your experience is positive, please come to your visits with questions and concerns and do not be afraid to ask what is on your mind!  We will see you in the office every 4 weeks until you are 30 weeks, then every 2 weeks until 36 weeks and then weekly until your baby is born.           "

## 2024-03-01 LAB
BACTERIA UR CULT: NORMAL
C TRACH RRNA SPEC QL NAA+PROBE: NEGATIVE
N GONORRHOEA DNA SPEC QL PROBE+SIG AMP: NEGATIVE

## 2024-03-03 PROBLEM — O99.210 OBESITY IN PREGNANCY (HHS-HCC): Status: ACTIVE | Noted: 2023-08-15

## 2024-03-03 PROBLEM — O09.521 MULTIGRAVIDA OF ADVANCED MATERNAL AGE IN FIRST TRIMESTER (HHS-HCC): Status: ACTIVE | Noted: 2024-03-03

## 2024-03-03 PROBLEM — O09.511 PRIMIGRAVIDA OF ADVANCED MATERNAL AGE IN FIRST TRIMESTER (HHS-HCC): Status: ACTIVE | Noted: 2024-03-03

## 2024-03-20 ENCOUNTER — LAB (OUTPATIENT)
Dept: LAB | Facility: LAB | Age: 34
End: 2024-03-20
Payer: COMMERCIAL

## 2024-03-20 ENCOUNTER — CLINICAL SUPPORT (OUTPATIENT)
Dept: GENETICS | Facility: CLINIC | Age: 34
End: 2024-03-20
Payer: COMMERCIAL

## 2024-03-20 ENCOUNTER — HOSPITAL ENCOUNTER (OUTPATIENT)
Dept: RADIOLOGY | Facility: CLINIC | Age: 34
Discharge: HOME | End: 2024-03-20
Payer: COMMERCIAL

## 2024-03-20 ENCOUNTER — INITIAL PRENATAL (OUTPATIENT)
Dept: MATERNAL FETAL MEDICINE | Facility: CLINIC | Age: 34
End: 2024-03-20
Payer: COMMERCIAL

## 2024-03-20 VITALS
SYSTOLIC BLOOD PRESSURE: 129 MMHG | HEIGHT: 61 IN | HEART RATE: 68 BPM | BODY MASS INDEX: 39.08 KG/M2 | DIASTOLIC BLOOD PRESSURE: 78 MMHG | WEIGHT: 207 LBS

## 2024-03-20 DIAGNOSIS — O35.FXX0 OMPHALOCELE OF FETUS IN SINGLETON PREGNANCY, ANTEPARTUM (HHS-HCC): ICD-10-CM

## 2024-03-20 DIAGNOSIS — O35.FXX0: Primary | ICD-10-CM

## 2024-03-20 DIAGNOSIS — O09.521 MULTIGRAVIDA OF ADVANCED MATERNAL AGE IN FIRST TRIMESTER (HHS-HCC): ICD-10-CM

## 2024-03-20 DIAGNOSIS — Z3A.08 8 WEEKS GESTATION OF PREGNANCY (HHS-HCC): ICD-10-CM

## 2024-03-20 DIAGNOSIS — E04.9 ENLARGED THYROID: ICD-10-CM

## 2024-03-20 DIAGNOSIS — Z3A.11 11 WEEKS GESTATION OF PREGNANCY (HHS-HCC): Primary | ICD-10-CM

## 2024-03-20 DIAGNOSIS — Z3A.11 11 WEEKS GESTATION OF PREGNANCY (HHS-HCC): ICD-10-CM

## 2024-03-20 LAB
ABO GROUP (TYPE) IN BLOOD: NORMAL
ANTIBODY SCREEN: NORMAL
ERYTHROCYTE [DISTWIDTH] IN BLOOD BY AUTOMATED COUNT: 12.1 % (ref 11.5–14.5)
EST. AVERAGE GLUCOSE BLD GHB EST-MCNC: 94 MG/DL
HBA1C MFR BLD: 4.9 %
HBV SURFACE AG SERPL QL IA: NONREACTIVE
HCT VFR BLD AUTO: 37.8 % (ref 36–46)
HCV AB SER QL: NONREACTIVE
HGB BLD-MCNC: 12.7 G/DL (ref 12–16)
HIV 1+2 AB+HIV1 P24 AG SERPL QL IA: NONREACTIVE
MCH RBC QN AUTO: 28.7 PG (ref 26–34)
MCHC RBC AUTO-ENTMCNC: 33.6 G/DL (ref 32–36)
MCV RBC AUTO: 85 FL (ref 80–100)
NRBC BLD-RTO: 0 /100 WBCS (ref 0–0)
PLATELET # BLD AUTO: 247 X10*3/UL (ref 150–450)
RBC # BLD AUTO: 4.43 X10*6/UL (ref 4–5.2)
REFLEX ADDED, ANEMIA PANEL: NORMAL
RH FACTOR (ANTIGEN D): NORMAL
RUBV IGG SERPL IA-ACNC: 2.4 IA
RUBV IGG SERPL QL IA: POSITIVE
TREPONEMA PALLIDUM IGG+IGM AB [PRESENCE] IN SERUM OR PLASMA BY IMMUNOASSAY: NONREACTIVE
TSH SERPL-ACNC: 2.44 MIU/L (ref 0.44–3.98)
VARICELLA ZOSTER IGG INDEX: 1.2 IA
VZV IGG SER QL IA: POSITIVE
WBC # BLD AUTO: 6.4 X10*3/UL (ref 4.4–11.3)

## 2024-03-20 PROCEDURE — 86780 TREPONEMA PALLIDUM: CPT

## 2024-03-20 PROCEDURE — 83020 HEMOGLOBIN ELECTROPHORESIS: CPT

## 2024-03-20 PROCEDURE — 87340 HEPATITIS B SURFACE AG IA: CPT

## 2024-03-20 PROCEDURE — 87389 HIV-1 AG W/HIV-1&-2 AB AG IA: CPT

## 2024-03-20 PROCEDURE — 85027 COMPLETE CBC AUTOMATED: CPT

## 2024-03-20 PROCEDURE — 83036 HEMOGLOBIN GLYCOSYLATED A1C: CPT

## 2024-03-20 PROCEDURE — 83021 HEMOGLOBIN CHROMOTOGRAPHY: CPT

## 2024-03-20 PROCEDURE — 99213 OFFICE O/P EST LOW 20 MIN: CPT | Performed by: OBSTETRICS & GYNECOLOGY

## 2024-03-20 PROCEDURE — 36415 COLL VENOUS BLD VENIPUNCTURE: CPT

## 2024-03-20 PROCEDURE — 99213 OFFICE O/P EST LOW 20 MIN: CPT | Mod: 25 | Performed by: OBSTETRICS & GYNECOLOGY

## 2024-03-20 PROCEDURE — 76817 TRANSVAGINAL US OBSTETRIC: CPT | Performed by: OBSTETRICS & GYNECOLOGY

## 2024-03-20 PROCEDURE — 86901 BLOOD TYPING SEROLOGIC RH(D): CPT

## 2024-03-20 PROCEDURE — 86850 RBC ANTIBODY SCREEN: CPT

## 2024-03-20 PROCEDURE — 76801 OB US < 14 WKS SINGLE FETUS: CPT | Performed by: OBSTETRICS & GYNECOLOGY

## 2024-03-20 PROCEDURE — 76817 TRANSVAGINAL US OBSTETRIC: CPT

## 2024-03-20 PROCEDURE — 84443 ASSAY THYROID STIM HORMONE: CPT

## 2024-03-20 PROCEDURE — 86900 BLOOD TYPING SEROLOGIC ABO: CPT

## 2024-03-20 PROCEDURE — 86803 HEPATITIS C AB TEST: CPT

## 2024-03-20 PROCEDURE — 96040 PR MEDICAL GENETICS COUNSELING EACH 30 MINUTES: CPT | Performed by: GENETIC COUNSELOR, MS

## 2024-03-20 PROCEDURE — 96040 HC GENETIC COUNSELING, EACH 30 MIN: CPT | Performed by: GENETIC COUNSELOR, MS

## 2024-03-20 PROCEDURE — 86317 IMMUNOASSAY INFECTIOUS AGENT: CPT

## 2024-03-20 PROCEDURE — 86787 VARICELLA-ZOSTER ANTIBODY: CPT

## 2024-03-20 PROCEDURE — 76801 OB US < 14 WKS SINGLE FETUS: CPT

## 2024-03-20 ASSESSMENT — PAIN SCALES - GENERAL: PAINLEVEL: 0-NO PAIN

## 2024-03-20 NOTE — PROGRESS NOTES
"Thank you for the referral of Nathan Jolly.  She is a 33 year old, , female who was 11 5/7 weeks pregnant at the time of our appointment with an EDC of 2024.  She was seen for genetic counseling due to fetal omphalocele.        PAST HISTORY:  Patient reports sure LMP and regular cycles from which EDC of 10/5/24 was obtained. 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 Due to these findings, genetic counseling was offered and performed same day.     Patient also reports that she and her  had carrier screening performed through the [facility] and were considered \"compatible\". This screening is available to couples of Advent ancestry and screens for a common set of autosomal recessive disorders. Actual carrier screening results are not reported; the couple is informed of whether or not they are considered \"compatible\" based on these screening results.     FAMILY HISTORY  Medical and family histories were reviewed and the following concerns were reported:    Patient   -personal history of anxiety and depression  -anxiety/depression reported in patient's 3 siblings  -sister has a sone and a daughter with mild autism  -another sister has a son with type I diabetes  -paternal aunt with Parkinson's  -mother and sister have hypothyroidism    Patient's reproductive partner  -brother  of colon cancer at age 34  -sister has a son and daughter with moderate autism  -mother has hypothyroidism  -paternal uncle has autism      The remainder of the family history was negative for birth defects, intellectual disability, recurrent pregnancy loss, or recognized inherited conditions.  Consanguinity denied.          SELF REPORTED RACE/ETHNICITY:  Patient: Eastern , [place] ancestry reported  Patient's " partner: [place], [place]grin, Pentecostalism ancestry reported     COUNSELING:    The following information was discussed with your patient:    1. An omphalocele is a birth defect in which the contents of the abdomen protrude through a defect at the base of the umbilical cord. Bowel, stomach, and liver may protrude through the opening; however are typically covered by a membrane. Approximately 30% of omphaloceles are associated with a chromosome abnormality; however risk of chromosome abnormality, or other single gene disorder is likely to be higher in the context of other fetal abnormalities. Approximately 1/3 of fetuses with omphalocele will have additional ultrasound abnormalities. At this time, ultrasound is indicating possible growth lag, single umbilical artery, and abnormal flow in the ductus venosus. Future ultrasounds will be helpful in determining if there additional fetal concerns. Smaller omphaloceles containing bowel only are also associated with a higher rate of chromosome abnormalities (up to 60%) rather than larger, liver containing omphaloceles (as low as 10% risk). Associated chromosome abnormalities are most often aneuploidies; however also include smaller microarray abnormalities. Jonny Wiedemann syndrome also has a significant association (10-20% risk) with isolated omphalocele.     2. Chromosomes, genes, non-disjunction, and features of some more common genetic syndromes that may be associated with an omphalocele were reviewed, including Down syndrome, trisomies 13 and 18, and Jonny Wiedemann syndrome.     3. The availability, benefits and limitations of ultrasound study. An ultrasound study is recommended at 19-20 weeks gestation to survey fetal organs. Fetal echocardiogram is also recommended. Frequent growth ultrasounds will likely also be recommended due to increased risk of fetal growth concerns.     4. The availability, benefits, and limitations of the MaterniT GENOME non invasive prenatal  test. This test is designed to screen for any chromosome abnormality, including deletions and duplications, that are = 7 Mb in size, with at least 95.9% sensitivity and 99.9% specificity. It also includes screening for select microdeletions including 22q11 deletion (associated with DiGeorge syndrome), 15q11 deletion (associated with Prader-Willi/Angelman syndromes), 11q23 deletion (associated with Marco A syndrome), 8q24 deletion (associated with Kelin-Giedion syndrome), 5p15 deletion (associated with Cri-du-Chat syndrome), 4p16 deletion (associated with Pérez-Hirschhorn syndrome), and 1p36 deletion (associated with 1p36 deletion syndrome). This is the most comprehensive fetal chromosomal test currently clinically available noninvasively. As with standard NIPT, false positives and false negatives are possible. As there is currently no published data regarding positive predictive value of the test, prenatal diagnosis through amniocentesis should be considered to confirm any abnormal findings.     5. The availability of diagnostic genetic testing via CVS or amniocentesis that may include chromosomal or copy number testing, single gene and methylation testing for Jonny Wiedemann syndrome, or whole genome sequencing.  Limitations of CVS including confined placental mosaicism and inability to detect methylation abnormalities on this sample type at this early gestational age discussed. The methods, benefits, limitations and risks of CVS and amniocentesis discussed including 1 in 200 and a 1 in 400 risk of complications, respectively.      6.  genetic testing may also be performed on fetus is a genetic syndrome is suspected; however prenatal testing results may assist in guiding prenatal and delivery management recommendations. Consultation with pediatric surgery and neonatology should be considered in the 3rd trimester if this pregnancy continues.    7. Pregnancy options also discussed including continuation  v. Termination, and legal limit for termination in the state of [place].        8. Although this couple has already had carrier screening for several conditions more common in the [place], we also discussed the availability of more expanded/pan ethnic carrier screening for additional, primarily autosomal recessive, conditions. This testing would also report out carrier status for each person tested, regardless of whether their partner is a carrier for the same disorder or not. We discussed the pros and cons of expanded carrier screening including the higher likelihood of being identified as a carrier for at least one condition on a larger panel. Approximately 4-6% of couples are found to be at risk to have a child with a genetic disorder based on this screening. In rare cases, expanded carrier screening results may have health implications for the tested individual.      9. Additional family history risk assessment:   Due to family history of autism we discussed that most cases of autism are thought to be associated with multifactorial (combination of genetic and environmental) causes; however up to 30-40% of cases of autism may be associated with a specific genetic etiology. Genetic causes are more commonly identified in individuals with more severe cases of autism in which a component of intellectual disability may be present. Genetic evaluation may be considered for the affected individuals to determine if a genetic etiology may be identified which would assist with recurrence risk estimation for this family.  Fragile X syndrome is one of the more common single gene causes of autism in males. Carrier testing for Fragile X syndrome may be performed if desired.  -We also discussed hte multifactorial nature of most cases of diabetes, thyroid disorders, and mental health concerns  -We reviewed that pts 's family history may be consistent with an inherited predisposition to developing cancer. Genetic testing is  most informative when performed on an affected family member. As this person is , genetic counseling is available to the patient's  and/or his family if desired. An appointment for Cancer Genetic Counseling may be made at [facility] by calling [phone]. We also discussed that based on the affected individual's early age of diagnosis, colon cancer screening should be performed for the patient's  at this time. She reports he has already been obtaining this screening.                 DISPOSITION:  The patient stated that she understood the above information and elected to proceed with:  -cfDNA screening via MaterniTGenome test; order placed today and patient provided kit to take to lab  -patient will discuss additional testing and pregnancy options with her  and follow up with us regarding their plans  -If this pregnancy continues, follow up ultrasound is recommended at 16 weeks             Lubna Rutledge MS, Licensed Genetic Counselor spent 87 minutes with the patient with greater than 50% of the time spent in face to face counseling.     Thank you for allowing us to participate in the care of your patient.  Should you or your patient have any questions, please do not hesitate to contact our office at [phone].      Sincerely,      Lubna Rutledge MS  Licensed Genetic Counselor

## 2024-03-20 NOTE — PROGRESS NOTES
Ultrasound Findings:  BMI 39. AMA 35 with no aneuploidy screening to date.   Poor resolution.  Transvaginal evaluation in addition to transabdominal scanning was indicated and performed due to a malformation   - Posterior placenta accessible by CVS  - Likely single umbilical artery  - Large omphalocoele containing liver and bowel  - slight growth lag  - Normal NT  - Nasal bone was suboptimal  - ductus venosus shows reversal  30% of fetuses with omphalocoele have an underlying genetic etiology including trisomy, aneuploidy and some singe gene disorders.   She had genetic counseling to follow and will have a cfDNA genome.  She is considering her options and will contact myself or the genetic counselor with her decisions.  Thank you for allowing us to participate in the care of your patient.    Counseling Provided:   An omphalocele is a birth defect in which the contents of the abdomen protrude through a defect at the base of the umbilical cord. Bowel, stomach, and liver may protrude through the opening; however are typically covered by a membrane. Approximately 30% of omphaloceles are associated with a chromosome abnormality; however risk of chromosome abnormality, or other single gene disorder is likely to be higher in the context of other fetal abnormalities. As this is at a very early gestational age, other anomalies cannot be excluded. Approximately 1/3 of fetuses with omphalocele will have additional ultrasound abnormalities. Smaller omphaloceles containing bowel only are also associated with a higher rate of chromosome abnormalities (up to 60%) rather than larger, liver containing omphaloceles (as low as 10% risk). Associated chromosome abnormalities are most often aneuploidies; however also include smaller microarray abnormalities. Jonny Wiedemann syndrome also has a significant association (10-20% risk) with isolated omphalocele.     Prenatal options were discussed with the patient including CVS.  Amniocentesis and screening testing such as cfDNA genome.  The benefits, risks and limitations were discussed.    The patient was also offered post delivery testing after either pregnancy termination or at term.     She then followed with genetic counseling.  She decided to have cfDNA genome to start.    We then met after her counseling session.  She was told we could obtain a better idea of risks if there were associated anomalies at 16 weeks.  She will discuss the options with her  and contact us with her decision.

## 2024-03-22 LAB
HEMOGLOBIN A2: 2.9 % (ref 2–3.5)
HEMOGLOBIN A: 96.5 % (ref 95.8–98)
HEMOGLOBIN F: 0.6 % (ref 0–2)
HEMOGLOBIN IDENTIFICATION INTERPRETATION: NORMAL
PATH REVIEW-HGB IDENTIFICATION: NORMAL

## 2024-03-27 ENCOUNTER — TELEPHONE (OUTPATIENT)
Dept: GENETICS | Facility: CLINIC | Age: 34
End: 2024-03-27
Payer: COMMERCIAL

## 2024-03-27 ENCOUNTER — TELEPHONE (OUTPATIENT)
Dept: OBSTETRICS AND GYNECOLOGY | Facility: HOSPITAL | Age: 34
End: 2024-03-27
Payer: COMMERCIAL

## 2024-03-27 NOTE — TELEPHONE ENCOUNTER
RN returned call to patient regarding results  Patient identified by name and   Patient requesting results for Genome genetic testing it is still active inprocess, patient states the results came to her Mychart    RN sent a message to the Genetic counselor regarding results and patient states she will send her an email.  All questions and concerns were addressed.  SANDRITA Chang-RN

## 2024-03-27 NOTE — TELEPHONE ENCOUNTER
Spoke with patient that MaterniTgenome results are normal/negative. While this reduces chance of a chromosome abnormality, it does not rule out possibility of chromosome abnormality, or other type of genetic etiology.     Additional testing is available via CVS or amniocentesis. We reviewed that while whole genome sequencing can be considered via either procedure, methylation testing for Jonny Wiedemann syndrome is not reliably performed via WGS, and separate methylation testing would need to be ordered, and can only be performed on amniocentesis sample.     Patient is not feeling as reassured as she would like with her negative cfDNA screening, and is considering proceeding with diagnostic testing. She is unsure if she would like to proceed with CVS at this time, or wait for amniocentesis to do all testing at once. She is aware that if WGS via CVS is negative, an amniocentesis would be offered to complete testing for Jonny Wiedemann syndrome. Risks of both procedures were again reviewed.     I informed patient that I am out of office for the next week and a half, and as she is 12 4/7 today, CVS, if desired, would need to be attempted ASAP, and likely no later than next Tuesday or Wednesday. Patient provided with phone numbers for follow up. Patient states she will discuss these options with her , and likely call Brianna Rivas tomorrow if she desires coordination of CVS appointment.     Lubna Rutledge MS, [facility]

## 2024-03-28 ENCOUNTER — INITIAL PRENATAL (OUTPATIENT)
Dept: MATERNAL FETAL MEDICINE | Facility: HOSPITAL | Age: 34
End: 2024-03-28
Payer: COMMERCIAL

## 2024-03-28 ENCOUNTER — ROUTINE PRENATAL (OUTPATIENT)
Dept: OBSTETRICS AND GYNECOLOGY | Facility: CLINIC | Age: 34
End: 2024-03-28
Payer: COMMERCIAL

## 2024-03-28 ENCOUNTER — HOSPITAL ENCOUNTER (OUTPATIENT)
Dept: RADIOLOGY | Facility: HOSPITAL | Age: 34
Discharge: HOME | End: 2024-03-28
Payer: COMMERCIAL

## 2024-03-28 VITALS — WEIGHT: 209.4 LBS | BODY MASS INDEX: 39.57 KG/M2 | SYSTOLIC BLOOD PRESSURE: 128 MMHG | DIASTOLIC BLOOD PRESSURE: 72 MMHG

## 2024-03-28 DIAGNOSIS — Z3A.12 12 WEEKS GESTATION OF PREGNANCY (HHS-HCC): ICD-10-CM

## 2024-03-28 DIAGNOSIS — O35.FXX1: Primary | ICD-10-CM

## 2024-03-28 DIAGNOSIS — O09.91 SUPERVISION OF HIGH RISK PREGNANCY IN FIRST TRIMESTER (HHS-HCC): Primary | ICD-10-CM

## 2024-03-28 DIAGNOSIS — Z3A.08 8 WEEKS GESTATION OF PREGNANCY (HHS-HCC): ICD-10-CM

## 2024-03-28 DIAGNOSIS — O36.91X0: ICD-10-CM

## 2024-03-28 DIAGNOSIS — O28.3 ABNORMAL FETAL ULTRASOUND: ICD-10-CM

## 2024-03-28 PROCEDURE — 99214 OFFICE O/P EST MOD 30 MIN: CPT | Performed by: OBSTETRICS & GYNECOLOGY

## 2024-03-28 PROCEDURE — 99214 OFFICE O/P EST MOD 30 MIN: CPT | Mod: 25 | Performed by: OBSTETRICS & GYNECOLOGY

## 2024-03-28 PROCEDURE — 76816 OB US FOLLOW-UP PER FETUS: CPT

## 2024-03-28 PROCEDURE — 76813 OB US NUCHAL MEAS 1 GEST: CPT | Performed by: OBSTETRICS & GYNECOLOGY

## 2024-03-28 PROCEDURE — 76813 OB US NUCHAL MEAS 1 GEST: CPT

## 2024-03-28 PROCEDURE — 99213 OFFICE O/P EST LOW 20 MIN: CPT | Performed by: NURSE PRACTITIONER

## 2024-03-28 PROCEDURE — 76816 OB US FOLLOW-UP PER FETUS: CPT | Performed by: OBSTETRICS & GYNECOLOGY

## 2024-03-28 NOTE — PROGRESS NOTES
Assessment/Plan   Diagnoses and all orders for this visit:  Supervision of high risk pregnancy in first trimester  Abnormal fetal ultrasound  12 weeks gestation of pregnancy    Patient to have repeat US today at [facility] 1200.   Reviewed warning signs and when to call provider  Follow up in 4 weeks for a routine prenatal visit or PRN.    HANS Frazier, APRN-CNP    Aryan Jolly is a 33 y.o.  at 12w5d with a working estimated date of delivery of 10/5/2024, by Last Menstrual Period who presents for a routine prenatal visit. She denies vaginal bleeding, abdominal pain, leakage of fluid.     Patient endorses anxiety and stress re: recent US findings. US on 3/20/24 notable for multiple abnormalities including single umbilical artery, large omphalocele containing liver and bowel, ductus venosus shows reversal. She had genetics consult on 3/20 as well, was counseled on options of CVS, amniocentesis, and cfDNA. Patient had normal cfDNA results on 3/26/24. She is unsure today of whether she wants to move forward with CVS procedure. Her and her partner are requesting an additional ultrasound for a second opinion to help them make a decision.      Physically, she denies concerns today. She endorses intermittent cramping but states this is common for her during her prior pregnancies.     Her pregnancy is complicated by:  Pregnancy Problems (from 24 to present)       Problem Noted Resolved    Abnormal fetal ultrasound 3/28/2024 by HANS Penny, APRN-CNP No    Priority:  Medium      Overview Signed 3/28/2024 12:33 PM by HANS Penny, APRN-CNP     3/20/24 US: Posterior placenta accessible by CVS; Likely single umbilical artery; Large omphalocoele containing liver and bowel; Slight growth lag; Nasal bone was suboptimal; ductus venosus shows reversal    Normal cfDNA on 3/26/24       Multigravida of advanced maternal age in first trimester 3/3/2024 by Pamella Mayers,  Improved   Off of IVF  On water flushes via PEG     APRN-CNM No    Priority:  Medium      Obesity in pregnancy 8/15/2023 by Ronna Emanuel MD No    Priority:  Medium       Objective   Physical Exam  Weight: 95 kg (209 lb 6.4 oz), Pregravid BMI: Could not be calculated  TWG: Not found.   Expected Total Weight Gain: Could not be calculated   BP: 128/72  Fetal Heart Rate: 160 Fundal Height (cm):  (cwd)    Postpartum Depression: Low Risk  (2024)    [place]  Depression Scale     Last EPDS Total Score: 0     Last EPDS Self Harm Result: Never        Prenatal Labs  Lab Results   Component Value Date    HGB 12.7 2024    HCT 37.8 2024     2024    ABO A 2024    LABRH POS 2024    NEISSGONOAMP Negative 2024    CHLAMTRACAMP Negative 2024    SYPHT Nonreactive 2024    HEPBSAG Nonreactive 2024    HIV1X2 Nonreactive 2024    URINECULTURE No significant growth 2024

## 2024-03-28 NOTE — PROGRESS NOTES
Ms Jolly is a 34yo  at 12 5/7 weeks presenting after a dx of a large omphalocole on a scan one week ago. She had genetic counseling and elected cfDNA genome.  This was risk reducing.  She presents today to discuss definitive testing.    Sonogram today does not afford more information regarding associated malformations.  The NT is normal.  The nasal bone is seen and interval growth is normal.  The CRL lags menstrual dating by 5 days.    The following counseling was provided To the patient face to face:  - Her  joined by phone  - Due to the early GA, the anatomic survey cannot be evaluated.  There is little information gained from the sonograph today except normal interval growth  - Larger omphalocoeles have less genetic causes than smaller ones.  This is a large omphalocoele.  - Her current testing evaluated for aneuploidy down to the 7MB level. Any change > 1 MB is considered pathologic. Diagnostic testing would assess this risk better.  - R/B/A of amniocentesis and CVS was discussed with an overall risk of 1/800 for amniocentesis and 1/400 for CVS.  The 2% risk that the CVS is not representative of the fetus was discussed.  - The risk for genetic problems (aneuploidy and genetic syndromes) increases with associated malformations.  A scan at 16 weeks may provide more information , though a complete scan cannot be performed at that GA  - The availability of a fetal echo at 16 weeks was discussed as a major heart malformation (really any malformation) would significantly increase the risk for a genetic abnormality and a poor outcome  - As this is a large omphalocoele, they were informed of the long NICU stay and need for staged surgical repair.    The patient and her  carefully considered the options.  They would like to have fetal echo and follow up ultrasound at 16 weeks.  They will use this information to decide if they want amniocentesis. They know how to contact me if they want to change  course.

## 2024-04-25 ENCOUNTER — CLINICAL SUPPORT (OUTPATIENT)
Dept: GENETICS | Facility: HOSPITAL | Age: 34
End: 2024-04-25
Payer: COMMERCIAL

## 2024-04-25 ENCOUNTER — ROUTINE PRENATAL (OUTPATIENT)
Dept: OBSTETRICS AND GYNECOLOGY | Facility: CLINIC | Age: 34
End: 2024-04-25
Payer: COMMERCIAL

## 2024-04-25 VITALS — DIASTOLIC BLOOD PRESSURE: 70 MMHG | WEIGHT: 210.6 LBS | BODY MASS INDEX: 39.79 KG/M2 | SYSTOLIC BLOOD PRESSURE: 134 MMHG

## 2024-04-25 VITALS
WEIGHT: 210 LBS | DIASTOLIC BLOOD PRESSURE: 67 MMHG | SYSTOLIC BLOOD PRESSURE: 117 MMHG | BODY MASS INDEX: 39.65 KG/M2 | HEIGHT: 61 IN

## 2024-04-25 DIAGNOSIS — O09.521 MULTIGRAVIDA OF ADVANCED MATERNAL AGE IN FIRST TRIMESTER (HHS-HCC): ICD-10-CM

## 2024-04-25 DIAGNOSIS — O28.3 ABNORMAL FETAL ULTRASOUND: ICD-10-CM

## 2024-04-25 DIAGNOSIS — Z34.92 PRENATAL CARE IN SECOND TRIMESTER (HHS-HCC): Primary | ICD-10-CM

## 2024-04-25 DIAGNOSIS — O35.FXX0 OMPHALOCELE OF FETUS IN SINGLETON PREGNANCY, ANTEPARTUM (HHS-HCC): ICD-10-CM

## 2024-04-25 DIAGNOSIS — O09.899 SINGLE UMBILICAL ARTERY AFFECTING MANAGEMENT OF MOTHER IN SINGLETON PREGNANCY, ANTEPARTUM (HHS-HCC): ICD-10-CM

## 2024-04-25 PROCEDURE — 99213 OFFICE O/P EST LOW 20 MIN: CPT

## 2024-04-25 PROCEDURE — 96040 HC GENETIC COUNSELING, EACH 30 MIN: CPT | Performed by: GENETIC COUNSELOR, MS

## 2024-04-25 PROCEDURE — 96040 PR MEDICAL GENETICS COUNSELING EACH 30 MINUTES: CPT | Performed by: GENETIC COUNSELOR, MS

## 2024-04-25 ASSESSMENT — PAIN SCALES - GENERAL: PAINLEVEL: 0-NO PAIN

## 2024-04-25 NOTE — PROGRESS NOTES
"Nathan Jolly is a 33 year old, , female who was 16 4/7 weeks gestation at the time of our appointment with an EDC of 2024.  She was seen today for follow up genetic counseling and to consent to diagnostic prenatal genetic testing in the setting of fetal omphalocele.          PAST HISTORY:  Patient reports sure LMP and regular cycles from which EDC of 10/5/24 was obtained. Ultrasound was performed at 11 4/7 weeks gestation at which time CRL dating was lagging LMP dating by 5 days, large fetal omphalocele containing liver and bowel, single umbilical artery, and reverse flow in the ductus venosus were noted. Due to these findings, genetic counseling was offered and performed same day. Patient elected cfDNA screening (MaterniTGenome screen) which was reported as negative. Follow up ultrasound at 12 5/7 weeks gestation noted similar findings; normal nuchal translucency measurement and 6 day growth lag. Patient is strongly considering proceeding with amniocentesis tomorrow following fetal echocardiogram and early fetal anatomy ultrasound and would like to discuss and consent to potential testing.      Patient also reports that she and her  had carrier screening performed through the [facility] and were considered \"compatible\". This screening is available to couples of [place] ancestry and screens for a common set of autosomal recessive disorders. Actual carrier screening results are not reported; the couple is informed of whether or not they are considered \"compatible\" based on these screening results.      FAMILY HISTORY  Medical and family histories were reviewed and the following concerns were previously reported:     Patient   -personal history of anxiety and depression  -anxiety/depression reported in patient's 3 siblings  -sister has a sone and a daughter with mild autism  -another sister has a son with type I diabetes  -paternal aunt with Parkinson's  -mother and sister have " hypothyroidism     Patient's reproductive partner  -brother  of colon cancer at age 34  -sister has a son and daughter with moderate autism  -mother has hypothyroidism  -paternal uncle has autism        The remainder of the family history was negative for birth defects, intellectual disability, recurrent pregnancy loss, or recognized inherited conditions.  Consanguinity denied.              SELF REPORTED RACE/ETHNICITY:  Patient: Eastern , Voodoo ancestry reported  Patient's partner: Bulgarian, Turkmen, Voodoo ancestry reported      COUNSELING:     The following information was discussed with your patient today:     1.          We again reviewed that approximately 30% of omphaloceles are associated with a chromosome abnormality; however risk of chromosome abnormality, or other single gene disorder is likely to be higher in the context of other fetal abnormalities. Approximately 1/3 of fetuses with omphalocele will have additional ultrasound abnormalities. Previous ultrasound indicated possible growth lag, single umbilical artery, and abnormal flow in the ductus venosus. Future ultrasounds will be helpful in determining if there additional fetal concerns. Smaller omphaloceles containing bowel only are also associated with a higher rate of chromosome abnormalities (up to 60%) rather than larger, liver containing omphaloceles (as low as 10% risk). Associated chromosome abnormalities are most often aneuploidies; however also include smaller microarray abnormalities. Jonny Wiedemann syndrome also has a significant association (10-20% risk) with isolated omphalocele.      2. Chromosomes, genes, non-disjunction, and features of some more common genetic syndromes that may be associated with an omphalocele were reviewed, including Down syndrome, trisomies 13 and 18, and Jonny Wiedemann syndrome.      3. The availability, benefits and limitations of ultrasound study. An ultrasound study is planned tomorrow  in addition to fetal echocardiogram, comprehensive fetal anatomy ultrasound is recommended at 19-20 weeks gestation. Frequent growth ultrasounds may be recommended due to increased risk of fetal growth concerns.      4.  The availability of diagnostic genetic testing via amniocentesis that may include chromosomal or copy number testing, single gene and methylation testing for Jonny Wiedemann syndrome, or whole genome sequencing.  The methods, benefits, limitations and risks of amniocentesis discussed including 1 in 400 risk of complications, including miscarriage. The benefits and limitations of these testing options were also discussed, including timing of test results, cost, and options of proceeding with stepwise fashion for testing v. Comprehensive testing.       5.  genetic testing may also be performed on fetus is a genetic syndrome is suspected; however prenatal testing results may assist in guiding prenatal and delivery management recommendations. Consultation with pediatric surgery and neonatology should be considered in the 3rd trimester if this pregnancy continues.     6. Pregnancy options also discussed including continuation v. Termination, and legal limit for termination in the state of [place].           7. Although this couple has already had carrier screening for several conditions more common in the [place], we previously also discussed the availability of more expanded/pan ethnic carrier screening for additional, primarily autosomal recessive, conditions. This testing was not re-addressed at today's appointment.      8. Additional family history risk assessment performed at last appointment:   -Due to family history of autism we discussed that most cases of autism are thought to be associated with multifactorial (combination of genetic and environmental) causes; however up to 30-40% of cases of autism may be associated with a specific genetic etiology. Genetic causes are more commonly  identified in individuals with more severe cases of autism in which a component of intellectual disability may be present. Genetic evaluation may be considered for the affected individuals to determine if a genetic etiology may be identified which would assist with recurrence risk estimation for this family.  Fragile X syndrome is one of the more common single gene causes of autism in males. Carrier testing for Fragile X syndrome may be performed if desired.   -We also discussed hte multifactorial nature of most cases of diabetes, thyroid disorders, and mental health concerns  -We reviewed that pts 's family history may be consistent with an inherited predisposition to developing cancer. Genetic testing is most informative when performed on an affected family member. As this person is , genetic counseling is available to the patient's  and/or his family if desired. An appointment for Cancer Genetic Counseling may be made at [facility] by calling [phone]. We also discussed that based on the affected individual's early age of diagnosis, colon cancer screening should be performed for the patient's  at this time. She reports he has already been obtaining this screening.      At today's appointment we did discuss that some genetic etiologies of autism or predisposition to cancer conditions could potentially be identified via whole genome sequencing IF The fetus were affected by these conditions and reporting of actionable adult onset conditions is elected to be reported.                     DISPOSITION:  The patient stated that she understood the above information and is likely planning on proceeding with amniocentesis tomorrow. She plans to proceed with whole genome sequencing (variant of uncertain significance and ACMG secondary findings reporting BOTH elected) and reflex to methylation testing for Jonny Wiedemann syndrome (as this is not included in WGS per the testing  laboratory) if WGS is negative. Fetal and maternal consents signed today. Trio testing is planned; father of pregnancy will then need to sign consent tomorrow at time of amniocentesis.                    Lubna Rutledge MS, Licensed Genetic Counselor spent 55 minutes with the patient with greater than 50% of the time spent in face to face counseling.      Thank you for allowing us to participate in the care of your patient.  Should you or your patient have any questions, please do not hesitate to contact our office at [phone].        Sincerely,        Lubna Rutledge MS  Licensed Genetic Counselor

## 2024-04-26 ENCOUNTER — OFFICE VISIT (OUTPATIENT)
Dept: PEDIATRIC CARDIOLOGY | Facility: HOSPITAL | Age: 34
End: 2024-04-26
Payer: COMMERCIAL

## 2024-04-26 ENCOUNTER — HOSPITAL ENCOUNTER (OUTPATIENT)
Dept: PEDIATRIC CARDIOLOGY | Facility: HOSPITAL | Age: 34
Discharge: HOME | End: 2024-04-26
Payer: COMMERCIAL

## 2024-04-26 ENCOUNTER — HOSPITAL ENCOUNTER (OUTPATIENT)
Dept: RADIOLOGY | Facility: HOSPITAL | Age: 34
Discharge: HOME | End: 2024-04-26
Payer: COMMERCIAL

## 2024-04-26 ENCOUNTER — ROUTINE PRENATAL (OUTPATIENT)
Dept: MATERNAL FETAL MEDICINE | Facility: HOSPITAL | Age: 34
End: 2024-04-26
Payer: COMMERCIAL

## 2024-04-26 VITALS
SYSTOLIC BLOOD PRESSURE: 129 MMHG | HEART RATE: 96 BPM | HEIGHT: 62 IN | WEIGHT: 208.78 LBS | DIASTOLIC BLOOD PRESSURE: 84 MMHG | BODY MASS INDEX: 38.42 KG/M2

## 2024-04-26 DIAGNOSIS — O35.9XX0 KNOWN FETAL ANOMALY, ANTEPARTUM, SINGLE OR UNSPECIFIED FETUS (HHS-HCC): ICD-10-CM

## 2024-04-26 DIAGNOSIS — O35.BXX0 ABNORMAL FETAL ECHOCARDIOGRAPHY AFFECTING ANTEPARTUM CARE OF MOTHER, SINGLE OR UNSPECIFIED FETUS (HHS-HCC): Primary | ICD-10-CM

## 2024-04-26 DIAGNOSIS — Z3A.08 8 WEEKS GESTATION OF PREGNANCY (HHS-HCC): ICD-10-CM

## 2024-04-26 DIAGNOSIS — O35.FXX1: Primary | ICD-10-CM

## 2024-04-26 DIAGNOSIS — O35.8XX0 MATERNAL CARE FOR OTHER (SUSPECTED) FETAL ABNORMALITY AND DAMAGE, NOT APPLICABLE OR UNSPECIFIED (HHS-HCC): ICD-10-CM

## 2024-04-26 DIAGNOSIS — Q79.2 OMPHALOCELE (HHS-HCC): ICD-10-CM

## 2024-04-26 DIAGNOSIS — O09.892: ICD-10-CM

## 2024-04-26 PROCEDURE — 99215 OFFICE O/P EST HI 40 MIN: CPT | Performed by: OBSTETRICS & GYNECOLOGY

## 2024-04-26 PROCEDURE — 3008F BODY MASS INDEX DOCD: CPT | Performed by: PEDIATRICS

## 2024-04-26 PROCEDURE — 93325 DOPPLER ECHO COLOR FLOW MAPG: CPT | Performed by: PEDIATRICS

## 2024-04-26 PROCEDURE — 99215 OFFICE O/P EST HI 40 MIN: CPT | Performed by: PEDIATRICS

## 2024-04-26 PROCEDURE — 99205 OFFICE O/P NEW HI 60 MIN: CPT | Performed by: PEDIATRICS

## 2024-04-26 PROCEDURE — 76827 ECHO EXAM OF FETAL HEART: CPT | Performed by: PEDIATRICS

## 2024-04-26 PROCEDURE — 76811 OB US DETAILED SNGL FETUS: CPT | Performed by: OBSTETRICS & GYNECOLOGY

## 2024-04-26 PROCEDURE — 76811 OB US DETAILED SNGL FETUS: CPT

## 2024-04-26 PROCEDURE — 93325 DOPPLER ECHO COLOR FLOW MAPG: CPT

## 2024-04-26 NOTE — PATIENT INSTRUCTIONS
Today your fetal echocardiogram showed a possible hole in the wall between the pumping chambers of the heart, otherwise known as a ventricular septal defect (VSD).  This is often a false positive or optical illusion caused by ultrasound and the arteries leaving the heart making it look like a VSD.  A ventricular septal defect can close on its own.  Sometimes, there is a need for closure by surgery.  Often, kids need medications to help the heart cope with the extra work caused by the hole.  Kids with VSDs lead normal lives.  The baby's heart rhythm is normal.  The function of the heart is normal.  I would like to repeat your echocardiogram in 3-4 weeks.  At 16 weeks, the resolution of ultrasound is not great and we can miss subtle findings or over call findings.  The baby does need to see a cardiologist after birth.      Sometimes it is not possible to see all the heart structures because of the position or size of the fetus. This does not mean they are not there, but may mean that for technical reasons they cannot be assessed. Sometimes this information may not be important; while in some cases it means that definite answers are not possible. The echocardiographer will discuss this with you if necessary, and repeat studies are frequently performed later in the pregnancy.     Certain congenital heart abnormalities are also hard to detect by fetal echocardiograms, but often these are simple abnormalities. We do not mention this to concern you, but rather that you understand that there are technical limitations to such studies. It remains important that your pediatrician provides a normal careful medical examination of the baby (including the heart) takes place after birth, and if there were any suspicious cardiac findings, that these are evaluated in the usual way irrespective of the findings at fetal study.     Certain communications between the two sides of the circulation are normally present in all  developing babies and normally close after birth. We are not able to tell in advance whether this will occur, however there is only a tiny chance that they will not. Persistence of these structures is generally not a difficult problem to deal with.     We direct our attention only to the heart, where we have special expertise. This is not the same as your general obstetric ultrasound scan. Other ultrasound information about the fetus can be obtained from an obstetric ultrasonographer or your obstetrician.     I recommend a heart healthy lifestyle including routine aerobic activity (30 minutes; five days per week), a heart healthy diet and avoiding smoking.   I recommend that you discuss appropriate level of aerobic activity with your OB.

## 2024-04-26 NOTE — PROGRESS NOTES
Nathan Jolly was seen at the request of Frieda Gama for a chief complaint of an omphalocele; a report with my findings is being sent via written or electronic means the referring physician with my recommendations for treatment.     I had the pleasure of seeing Nathan Jolly in Pediatric Cardiology consultation at our [facility] location as part of our prenatal heart program for an early second trimester fetal echocardiogram for omphalocele and SUA.  She is a 33 y.o. year-old  woman, currently 16w6d weeks gestation. Patient's last menstrual period was 2023 (exact date). Estimated Date of Delivery: 10/5/24.  There have been no pregnancy complications.   She has not been hospitalized during this pregnancy.  She had a NIPT, which was normal.  She has an amniocentesis scheduled later today. She has not yet had a second trimester ultrasound. 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    Prior to the visit, I personally reviewed the cardiac portions of the obstetrical ultrasound performed on 3/28/24.  There is normal segmental anatomy with normal 4 chamber, outflow tract and 3 vessel views.  There is no evidence of septation defect, right or left ventricular outflow obstruction or significant valvular regurgitation.    Her previous obstetrical history is significant for 3 full term deliveries.  Her past medical history is significant for anxiety and depression.  She has no history of congenital heart disease, arrhythmia, cardiomyopathy, hypercholesterolemia, hypertension, diabetes, rheumatic heart disease, cancer, asthma, lupus, Sjogren syndrome, clotting disorder, alcohol abuse, phenylketonuria, or DiGeorge.  She has had no surgeries.  She is not taking any medications.  She has No Known Allergies.  She is currently taking prenatal vitamins.      Her family history is negative for congenital heart disease, early atherosclerosis, sudden cardiac death, long QT  "syndrome, cardiomyopathy, aortic aneurysm, or genetic or metabolic disease.  She states that her father had multiple MI's starting in his 40's and passed away in his 70's. Her mother has a-fib.    She currently lives with her spouse, 3 children and is .  She works as an informatics RN.  She does not smoke.  She denies illicit drug use or alcohol abuse.  She denies verbal, sexual, or physical abuse.     Delivery [facility]  Father of the baby's name: Rik    /84 (BP Location: Right arm, Patient Position: Sitting)   Pulse 96   Ht 1.573 m (5' 1.93\")   Wt 94.7 kg (208 lb 12.4 oz)   LMP 2023 (Exact Date)   BMI 38.27 kg/m²     She was resting comfortably in the examination room and alert, active and in no respiratory distress. Skin was without rash.  HEENT: moist mucous membranes, no JVD, goiter. Breathing is not labored.  She was acyanotic.  There was no peripheral edema.   The abdomen was gravid, soft, nontender with normal bowel sounds.  The liver was not palpable.  The spleen tip was not palpable.  She had a normal gait and normal strength in all extremities.  Cranial nerves II - XII are intact.  She had no clubbing, cyanosis, or edema.    A two-dimensional and Doppler early second trimester fetal echocardiogram was performed today and interpreted by me at 16w6d weeks gestation.  The fetal echocardiogram showed normal segmental anatomy with a possible perimembranous ventricular septal defect.  There is normal cardiac function.  There is no evidence of right or left ventricular outflow obstruction or significant valvular regurgitation.  The fetal heart rate was within normal limits without ectopy or arrhythmia seen.  The spectral Doppler pattern across all valves, venous structures, and arterial structures was within normal limits.  There is no pericardial effusion.  Please see full report for details.    In summary, Nathan Jolly is a 33 y.o. year-old  woman, currently 16w6d " weeks gestation, who had a early second trimester fetal echocardiogram at today's visit that demonstrated a possible perimembranous ventricular septal defect in the setting of a large omphalocele.  There is no evidence of ectopic cordis.  If the pregnancy is continued, recommend repeat fetal echocardiogram between 18-20 weeks of gestation.  Delivery plan per OB.  We did not prescribe any medications.  As a consequence of the small size of cardiac structures, image resolution at 11 to 14 weeks is typically less than that observed at later gestational ages; however, detailed segmental evaluations are still possible in the majority of fetuses, particularly at 12 to 16 weeks of gestation, with the aid of color Doppler. Furthermore, at these earlier gestational ages, growth of the fetal heart and great arteries is more accelerated than at later gestational ages; thus, the potential for evaluating anatomic details improves significantly every week. Given the limitations in image resolution with potential to miss more subtle cardiac lesions and the potential for the progression of lesions undetectable at earlier gestation, repeat mid-trimester (18-20 weeks gestation) assessment of all pregnancies evaluated before 15 to 16 weeks should be performed. At this point, delivery per OB at patient's preferred hospital.  Standard  care per  team.  Cardiology consult not necessary, unless there are clinical concerns.  We may change the level of care recommendation based on the follow up fetal echo.    We recommended a repeat fetal echocardiogram in 8-10 weeks.  We did not prescribe any medications.  We did not recommend intervention.  As always, we recommend a heart healthy lifestyle.  She does not necessarily need to follow up with pediatric cardiology after the baby is born unless the pediatric team has any concerns or worries.    Thank you for allowing me to participate in Nathan's care.  If you have any further  questions, please do not hesitate to contact me.     Luca Krishnan M.D.  [facility], Director  Ambulatory Pediatric Cardiology   Division of Pediatric Cardiology  [facility]  [facility]   of Pediatrics, [employer]  [facility] [address]  [place] [address]  Office:  [phone]  Fax:       [phone]  e-mail:  [email or web address]    I spent greater than 60 minutes in performance of this consultation, of which greater than 50% was related to coordination of care or counseling.

## 2024-04-26 NOTE — PROGRESS NOTES
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  There was a 5 day lag in CRL. She had genetic counseling and had a rr cfDNA genome.  She had a early fetal echo which questioned a VSD. BMI 39 with poor resolution.  A targeted anatomic survey was indicated   -Fetal biometry is low for the known gestational age  -Detailed anatomic evaluation of the fetal brain/ventricles, face, heart/outflow tracts and chest anatomy, abdominal organ specific anatomy, number/length/architecture of limbs and detailed evaluation of the umbilical cord and placenta and other fetal anatomy as clinically indicated was attempted.  -The following structures were not visualized: spine  - Resolution was limited however a large ompaholocele was seen containing the entire liver, stomach and bowel.  The anterior left diaphragm is not seen and is likely absent as the esophagus is seen going though this area. The apex of the heart is at this defect and the pericardium appears to be just within the omphalocoele.  This is a form of Pentalogy of Sayra.  -No other malformations were identified on this incomplete survey within limitations of sonographic evaluation at this gestational age and maternal acoustic properties and fetal lie.  The parents were extensively counseled (see EPIC note).  The decided on a TOP with genetic testing at the time of D & E.    Counseling Provided:  - The limitations of ultrasound at this GA was discussed  - The findings delineated. 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   - The anatomy was discussed including:       -The five defects are of the heart, pericardium, diaphragm, sternum, and abdominal wall are seen with Pentalogy of Sayra.  The anterior diaphragmatic abnormality suggests this is the most likely diagnosis.       -There are two categories, complete or partial. Complete, as the name indicates, refers to           the presence of all five defects, while others  may present with only some of the defects and referred to as partial.  This is considered partial as the heart remains wholly within the chest and it is unclear if there is a VSD.  - The exact cause is unknown.  The majority are sporadic however there are few cases investigated with sequencing. The option of WGS at birth is desired by this family.  - Delivery would be by  due to the unprotected liver if carried to term  - Surgical correction- Surgery involves correcting cardiac malformations if present,  restoring cardiac position and anatomy, and repairing the thoracoabdominal wall and diaphragmatic defects. Usually a staged approach is required. The parent were offered a pediatric surgical consultation and declined. Initial management is antibiotics and to allow spontaneous epithelialization of the abdominal wall.  - Minor heart defects are usually addressed after the abdominal wall issue. There are no major defects noted by Dr Krishnan or by my scan.  - Survival rates if giant omphalocoele are about 83%, much less for a partial Pentalogy of Sayra.  - A long NICU stay should be anticipated  - The family was offered counseling by our [facility] and declined. She also declined psychological support.    After careful consideration the parents would like a pregnancy termination with WGS on POC.   The appropriate staff was contacted to arrange.    Frieda Gama M.D.  [facility]  Division of Maternal Fetal Medicine  Clinical   Dept. of Reproductive Biology, [facility]  Office phone- [phone]  Nurse coordinator Sho Rivas- [phone]

## 2024-04-29 NOTE — PROGRESS NOTES
Assessment/Plan   Diagnoses and all orders for this visit:  Prenatal care in second trimester ([facility])  Multigravida of advanced maternal age in first trimester ([facility])  Abnormal fetal ultrasound    Ultrasound reviewed.   Provided reassurance of support in decision making.   Encouraged patient to reach out with any questions or concerns.   Discussed warning signs/ when to to come in/ when to call.   Follow up in 4 weeks for prenatal visit or prn.     HANS Feliciano    Aryan Jolly is a 33 y.o.  at 17w2d with a working estimated date of delivery of 10/5/2024, by Last Menstrual Period who presents for a routine prenatal visit.  She reports that she in currently unsure of how she and her  will proceed with this pregnancy due to fetal abnormalities noted on ultrasound.     Her pregnancy is complicated by:  Pregnancy Problems (from 24 to present)       Problem Noted Resolved    Abnormal fetal ultrasound 3/28/2024 by HANS Penny, APRN-CNP No    Priority:  Medium      Overview Signed 3/28/2024 12:33 PM by HANS Penny, APRN-CNP     3/20/24 US: Posterior placenta accessible by CVS; Likely single umbilical artery; Large omphalocoele containing liver and bowel; Slight growth lag; Nasal bone was suboptimal; ductus venosus shows reversal    Normal cfDNA on 3/26/24         Multigravida of advanced maternal age in first trimester ([facility]) 3/3/2024 by HANS Feliciano No    Priority:  Medium      Obesity in pregnancy ([facility]) 8/15/2023 by Ronna Emanuel MD No    Priority:  Medium               Objective   Physical Exam  Weight: 95.5 kg (210 lb 9.6 oz)  Expected Total Weight Gain: 5 kg (11 lb)-9 kg (19 lb)   Pregravid BMI: 38.31  BP: 134/70  Fetal Heart Rate: 150

## 2024-04-30 DIAGNOSIS — O35.9XX0 FETAL ABNORMALITY AFFECTING MANAGEMENT OF MOTHER, SINGLE OR UNSPECIFIED FETUS (HHS-HCC): Primary | ICD-10-CM

## 2024-04-30 DIAGNOSIS — Z33.2 ENCOUNTER FOR ELECTIVE TERMINATION OF PREGNANCY: ICD-10-CM

## 2024-05-01 ENCOUNTER — ROUTINE PRENATAL (OUTPATIENT)
Dept: MATERNAL FETAL MEDICINE | Facility: HOSPITAL | Age: 34
End: 2024-05-01
Payer: COMMERCIAL

## 2024-05-01 VITALS — DIASTOLIC BLOOD PRESSURE: 88 MMHG | BODY MASS INDEX: 37.95 KG/M2 | SYSTOLIC BLOOD PRESSURE: 128 MMHG | WEIGHT: 207 LBS

## 2024-05-01 DIAGNOSIS — O35.FXX1: ICD-10-CM

## 2024-05-01 DIAGNOSIS — Z33.2 ENCOUNTER FOR ELECTIVE TERMINATION OF PREGNANCY: ICD-10-CM

## 2024-05-01 DIAGNOSIS — Z3A.17 17 WEEKS GESTATION OF PREGNANCY (HHS-HCC): Primary | ICD-10-CM

## 2024-05-01 PROCEDURE — 99213 OFFICE O/P EST LOW 20 MIN: CPT | Performed by: OBSTETRICS & GYNECOLOGY

## 2024-05-01 ASSESSMENT — ENCOUNTER SYMPTOMS
HEMATOLOGIC/LYMPHATIC NEGATIVE: 0
ALLERGIC/IMMUNOLOGIC NEGATIVE: 0
MUSCULOSKELETAL NEGATIVE: 0
PSYCHIATRIC NEGATIVE: 0
GASTROINTESTINAL NEGATIVE: 0
ENDOCRINE NEGATIVE: 0
EYES NEGATIVE: 0
CONSTITUTIONAL NEGATIVE: 0
NEUROLOGICAL NEGATIVE: 0
CARDIOVASCULAR NEGATIVE: 0
RESPIRATORY NEGATIVE: 0

## 2024-05-01 NOTE — PROGRESS NOTES
2024   Nathan Jolly     [facility] CONSULT NOTE      HPI: Nathan Jolly is a 33 y.o.  at 17w4d here for visit to sign consent forms fro pregnancy termination    Doing well today, without acute complaints.  Understandably upset regarding recent fetal diagnosis.    10 point review of system is negative except as above    OB History  OB History    Para Term  AB Living   4 3 3 0 0 3   SAB IAB Ectopic Multiple Live Births   0 0 0 0 3      # Outcome Date GA Lbr Bradford/2nd Weight Sex Delivery Anes PTL Lv   4 Current            3 Term 18 40w0d  3.232 kg F Vag-Spont EPI N LIN      Name: Evy   2 Term 11/27/15 40w0d  3.232 kg M Vag-Spont EPI N LIN      Complications: Placenta, retained ([facility])      Name: Dawson   1 Term 13 39w0d  3.232 kg M Vag-Spont EPI N LIN      Complications: Placenta, retained ([facility])      Name: Fe       Medical History  Past Medical History:   Diagnosis Date    Cellulitis of left toe 2020    Elevated glucose tolerance test     Irregular menstrual cycle     IUD (intrauterine device) in place     Parity 3     Plantar fasciitis     Postpartum depression     Retained placenta ([facility])     Retained placenta without hemorrhage ([facility])     Retained placenta       Surgical History  Past Surgical History:   Procedure Laterality Date    DILATION AND CURETTAGE OF UTERUS  2015    Dilation And Curettage       Family History  family history includes Depression in her father; Diabetes in her father; Heart failure in her father; Hypothyroidism in her mother and sister; Non hodgkins lymphoma in her father; Parkinsonism in an other family member.    Social History  Social History     Tobacco Use    Smoking status: Never    Smokeless tobacco: Never   Vaping Use    Vaping status: Never Used   Substance Use Topics    Alcohol use: Never    Drug use: Never       Allergies  No Known Allergies    Medications:  Medication Documentation Review Audit       Reviewed by Karen  "CRISTAL Riley LPN (Licensed Nurse) on 24 at 1012      Medication Order Taking? Sig Documenting Provider Last Dose Status   prenatal no115/iron/folic acid (PRENATAL 19 ORAL) [id number]  Take by mouth. Historical Provider, MD  Active                    OBJECTIVE  Visit Vitals  /88   Wt 93.9 kg (207 lb)   LMP 2023 (Exact Date)   BMI 37.95 kg/m²   OB Status Pregnant   Smoking Status Never   BSA 2.03 m²       Physical exam  Gen: NAD  HEENT: EOMI, CN2-12 intact  Pulm: non-labored    ASSESSMENT & PLAN    Nathan Jolly is a 33 y.o.  at 17w4d here for the followin. Desires pregnancy termination  Patient counseled on her options of continuing the pregnancy, terminating the pregnancy and adoption and she has decided to terminate the pregnancy, due to fetal abnormalities with procedure to be performed at  in order to facilitate genetic evalaution.  I described the nature and purpose of the termination of pregnancy (\"\") procedure, the risks associated with that procedure (bleeding, infection, perforation of the uterus), the probable gestational age of the embryo or fetus, and the medical risks associated with carrying a pregnancy to term.  24 hour consent and HB 59 forms were filled out.  The patient was offered copies of the [facility] Fetal Development Book and Pregnancy Resource Guide.  She was also informed on the availability of post-termination resources and counseling.  Instructions for follow-up were reviewed with the patient.      Darron Moore MD  Maternal Fetal Medicine  "

## 2024-05-02 ENCOUNTER — LAB (OUTPATIENT)
Dept: LAB | Facility: LAB | Age: 34
End: 2024-05-02
Payer: COMMERCIAL

## 2024-05-02 ENCOUNTER — CLINICAL SUPPORT (OUTPATIENT)
Dept: GENETICS | Facility: HOSPITAL | Age: 34
End: 2024-05-02
Payer: COMMERCIAL

## 2024-05-02 ENCOUNTER — ROUTINE PRENATAL (OUTPATIENT)
Dept: MATERNAL FETAL MEDICINE | Facility: HOSPITAL | Age: 34
End: 2024-05-02
Payer: COMMERCIAL

## 2024-05-02 ENCOUNTER — PREP FOR PROCEDURE (OUTPATIENT)
Dept: OBSTETRICS AND GYNECOLOGY | Facility: CLINIC | Age: 34
End: 2024-05-02

## 2024-05-02 VITALS — WEIGHT: 207 LBS | SYSTOLIC BLOOD PRESSURE: 114 MMHG | DIASTOLIC BLOOD PRESSURE: 72 MMHG | BODY MASS INDEX: 37.95 KG/M2

## 2024-05-02 DIAGNOSIS — O28.3 ABNORMAL FETAL ULTRASOUND: Primary | ICD-10-CM

## 2024-05-02 DIAGNOSIS — O35.FXX1: ICD-10-CM

## 2024-05-02 DIAGNOSIS — O35.FXX0 OMPHALOCELE OF FETUS IN SINGLETON PREGNANCY, ANTEPARTUM (HHS-HCC): ICD-10-CM

## 2024-05-02 DIAGNOSIS — O28.3 ABNORMAL FETAL ULTRASOUND: ICD-10-CM

## 2024-05-02 DIAGNOSIS — Z33.2 ENCOUNTER FOR ELECTIVE TERMINATION OF PREGNANCY: ICD-10-CM

## 2024-05-02 LAB
ERYTHROCYTE [DISTWIDTH] IN BLOOD BY AUTOMATED COUNT: 12.5 % (ref 11.5–14.5)
HCT VFR BLD AUTO: 36.1 % (ref 36–46)
HGB BLD-MCNC: 11.9 G/DL (ref 12–16)
MCH RBC QN AUTO: 28.1 PG (ref 26–34)
MCHC RBC AUTO-ENTMCNC: 33 G/DL (ref 32–36)
MCV RBC AUTO: 85 FL (ref 80–100)
NRBC BLD-RTO: 0 /100 WBCS (ref 0–0)
PLATELET # BLD AUTO: 246 X10*3/UL (ref 150–450)
RBC # BLD AUTO: 4.24 X10*6/UL (ref 4–5.2)
WBC # BLD AUTO: 8.4 X10*3/UL (ref 4.4–11.3)

## 2024-05-02 PROCEDURE — 85027 COMPLETE CBC AUTOMATED: CPT

## 2024-05-02 PROCEDURE — 86900 BLOOD TYPING SEROLOGIC ABO: CPT

## 2024-05-02 PROCEDURE — 96040 PR MEDICAL GENETICS COUNSELING EACH 30 MINUTES: CPT | Performed by: GENETIC COUNSELOR, MS

## 2024-05-02 PROCEDURE — 57800 DILATION OF CERVICAL CANAL: CPT | Performed by: OBSTETRICS & GYNECOLOGY

## 2024-05-02 PROCEDURE — 96040 HC GENETIC COUNSELING, EACH 30 MIN: CPT | Performed by: GENETIC COUNSELOR, MS

## 2024-05-02 PROCEDURE — 99024 POSTOP FOLLOW-UP VISIT: CPT | Performed by: OBSTETRICS & GYNECOLOGY

## 2024-05-02 PROCEDURE — 86901 BLOOD TYPING SEROLOGIC RH(D): CPT

## 2024-05-02 PROCEDURE — 36415 COLL VENOUS BLD VENIPUNCTURE: CPT

## 2024-05-02 PROCEDURE — 86850 RBC ANTIBODY SCREEN: CPT

## 2024-05-02 ASSESSMENT — ENCOUNTER SYMPTOMS
HEMATOLOGIC/LYMPHATIC NEGATIVE: 0
PSYCHIATRIC NEGATIVE: 0
ENDOCRINE NEGATIVE: 0
MUSCULOSKELETAL NEGATIVE: 0
CARDIOVASCULAR NEGATIVE: 0
RESPIRATORY NEGATIVE: 0
CONSTITUTIONAL NEGATIVE: 0
ALLERGIC/IMMUNOLOGIC NEGATIVE: 0
NEUROLOGICAL NEGATIVE: 0
EYES NEGATIVE: 0
GASTROINTESTINAL NEGATIVE: 0

## 2024-05-02 NOTE — PROCEDURES
Insertion of Cervical Dilators    Date/Time: 2024 7:04 PM    Performed by: Darron Moore MD     24 hr consent and HB59 signed previously were reviewed and noted to be complete prior to the procedure.    Prior to the procedure the patient was consented for laminaria placement and dilation and evacuation procedure. Physician performing  consent signed.  I reviewed the risks of infection, bleeding and  labor after placement.  I reviewed the risks of dilation and evacuation including risks of uterine perforation, uterine adhesions, bleeding, infection and injury to other organs.       The patient was offered the opportunity to see or hear the fetal heart beat, this was declined.    The patient took 1000 mg of acetaminophen prior to the procedure.    Universal Protocol:     Patient states understanding of procedure being performed: yes      Relevant documents present and verified: yes      Test results available and properly labeled: yes      Imaging studies available: yes      Required blood products, implants, devices, and special equipment available: yes      Site marked: n/a.    Pre-procedure:     Pre-procedure timeout performed: yes      Premeds:  Ibuprofen    Prepped with: povidone-iodine      Local anesthetic:  Lidocaine 1%    Procedure:    Sterile speculum exam was performed with normal appearing cervix and vaginal mucosa.  With adequate visualization the cervix was swabbed x3 with betadine.  2 mL of 1% lidocaine was injected into the anterior aspect of the cervix.  The anterior aspect was grasped with a ring forcep.  A paracervical block was then performed by injecting 4 mL of 1% lidocaine at 4 o'clock and 8 o'clock.  3 laminaria were inserted into the endocervical canal with a ring forceps.  A single betadine-soaked gauze was inserted to hold the dilators in place.  The patient tolerated the procedure well.  Bleeding was minimal.    Precautions were reviewed and the offices contact  number was provided following the procedure today.

## 2024-05-03 ENCOUNTER — HOSPITAL ENCOUNTER (OUTPATIENT)
Facility: HOSPITAL | Age: 34
Setting detail: OUTPATIENT SURGERY
Discharge: HOME | End: 2024-05-03
Attending: OBSTETRICS & GYNECOLOGY | Admitting: OBSTETRICS & GYNECOLOGY
Payer: COMMERCIAL

## 2024-05-03 ENCOUNTER — ANESTHESIA (OUTPATIENT)
Dept: OPERATING ROOM | Facility: HOSPITAL | Age: 34
End: 2024-05-03
Payer: COMMERCIAL

## 2024-05-03 ENCOUNTER — ANESTHESIA EVENT (OUTPATIENT)
Dept: OPERATING ROOM | Facility: HOSPITAL | Age: 34
End: 2024-05-03
Payer: COMMERCIAL

## 2024-05-03 VITALS
HEIGHT: 61 IN | RESPIRATION RATE: 14 BRPM | BODY MASS INDEX: 39.21 KG/M2 | DIASTOLIC BLOOD PRESSURE: 68 MMHG | OXYGEN SATURATION: 98 % | WEIGHT: 207.67 LBS | SYSTOLIC BLOOD PRESSURE: 120 MMHG | TEMPERATURE: 97.9 F | HEART RATE: 90 BPM

## 2024-05-03 DIAGNOSIS — Z33.2 ENCOUNTER FOR ELECTIVE TERMINATION OF PREGNANCY: ICD-10-CM

## 2024-05-03 DIAGNOSIS — O35.9XX1 FETAL ABNORMALITY AFFECTING MANAGEMENT OF MOTHER, FETUS 1 OF MULTIPLE GESTATION (HHS-HCC): Primary | ICD-10-CM

## 2024-05-03 DIAGNOSIS — O35.9XX0 FETAL ABNORMALITY AFFECTING MANAGEMENT OF MOTHER, SINGLE OR UNSPECIFIED FETUS (HHS-HCC): ICD-10-CM

## 2024-05-03 LAB
ABO GROUP (TYPE) IN BLOOD: NORMAL
ANTIBODY SCREEN: NORMAL
RH FACTOR (ANTIGEN D): NORMAL

## 2024-05-03 PROCEDURE — A59841 PR INDUCED ABORTN BY DIL/EVAC: Performed by: ANESTHESIOLOGY

## 2024-05-03 PROCEDURE — 3600000005 HC OR TIME - INITIAL BASE CHARGE - PROCEDURE LEVEL FIVE: Performed by: OBSTETRICS & GYNECOLOGY

## 2024-05-03 PROCEDURE — A4217 STERILE WATER/SALINE, 500 ML: HCPCS | Performed by: OBSTETRICS & GYNECOLOGY

## 2024-05-03 PROCEDURE — 2500000005 HC RX 250 GENERAL PHARMACY W/O HCPCS

## 2024-05-03 PROCEDURE — 3600000010 HC OR TIME - EACH INCREMENTAL 1 MINUTE - PROCEDURE LEVEL FIVE: Performed by: OBSTETRICS & GYNECOLOGY

## 2024-05-03 PROCEDURE — 3700000002 HC GENERAL ANESTHESIA TIME - EACH INCREMENTAL 1 MINUTE: Performed by: OBSTETRICS & GYNECOLOGY

## 2024-05-03 PROCEDURE — 2500000005 HC RX 250 GENERAL PHARMACY W/O HCPCS: Performed by: ANESTHESIOLOGIST ASSISTANT

## 2024-05-03 PROCEDURE — 96372 THER/PROPH/DIAG INJ SC/IM: CPT | Performed by: OBSTETRICS & GYNECOLOGY

## 2024-05-03 PROCEDURE — A59841 PR INDUCED ABORTN BY DIL/EVAC: Performed by: ANESTHESIOLOGIST ASSISTANT

## 2024-05-03 PROCEDURE — 59841 INDUCED ABORTION DILAT&EVAC: CPT | Performed by: OBSTETRICS & GYNECOLOGY

## 2024-05-03 PROCEDURE — 2500000006 HC RX 250 W HCPCS SELF ADMINISTERED DRUGS (ALT 637 FOR ALL PAYERS)

## 2024-05-03 PROCEDURE — 2500000001 HC RX 250 WO HCPCS SELF ADMINISTERED DRUGS (ALT 637 FOR MEDICARE OP): Performed by: ANESTHESIOLOGIST ASSISTANT

## 2024-05-03 PROCEDURE — 7100000010 HC PHASE TWO TIME - EACH INCREMENTAL 1 MINUTE: Performed by: OBSTETRICS & GYNECOLOGY

## 2024-05-03 PROCEDURE — 7100000009 HC PHASE TWO TIME - INITIAL BASE CHARGE: Performed by: OBSTETRICS & GYNECOLOGY

## 2024-05-03 PROCEDURE — 2500000004 HC RX 250 GENERAL PHARMACY W/ HCPCS (ALT 636 FOR OP/ED)

## 2024-05-03 PROCEDURE — 2500000004 HC RX 250 GENERAL PHARMACY W/ HCPCS (ALT 636 FOR OP/ED): Performed by: OBSTETRICS & GYNECOLOGY

## 2024-05-03 PROCEDURE — 7100000002 HC RECOVERY ROOM TIME - EACH INCREMENTAL 1 MINUTE: Performed by: OBSTETRICS & GYNECOLOGY

## 2024-05-03 PROCEDURE — 3700000001 HC GENERAL ANESTHESIA TIME - INITIAL BASE CHARGE: Performed by: OBSTETRICS & GYNECOLOGY

## 2024-05-03 PROCEDURE — 7100000001 HC RECOVERY ROOM TIME - INITIAL BASE CHARGE: Performed by: OBSTETRICS & GYNECOLOGY

## 2024-05-03 PROCEDURE — 2500000004 HC RX 250 GENERAL PHARMACY W/ HCPCS (ALT 636 FOR OP/ED): Performed by: ANESTHESIOLOGIST ASSISTANT

## 2024-05-03 PROCEDURE — 2500000004 HC RX 250 GENERAL PHARMACY W/ HCPCS (ALT 636 FOR OP/ED): Performed by: ANESTHESIOLOGY

## 2024-05-03 PROCEDURE — 2500000006 HC RX 250 W HCPCS SELF ADMINISTERED DRUGS (ALT 637 FOR ALL PAYERS): Performed by: OBSTETRICS & GYNECOLOGY

## 2024-05-03 RX ORDER — GABAPENTIN 600 MG/1
600 TABLET ORAL ONCE
Status: DISCONTINUED | OUTPATIENT
Start: 2024-05-03 | End: 2024-05-03 | Stop reason: HOSPADM

## 2024-05-03 RX ORDER — VASOPRESSIN 20 U/ML
INJECTION PARENTERAL CONTINUOUS PRN
Status: COMPLETED | OUTPATIENT
Start: 2024-05-03 | End: 2024-05-03

## 2024-05-03 RX ORDER — CARBOPROST TROMETHAMINE 250 UG/ML
INJECTION, SOLUTION INTRAMUSCULAR
Status: DISCONTINUED
Start: 2024-05-03 | End: 2024-05-03 | Stop reason: HOSPADM

## 2024-05-03 RX ORDER — ACETAMINOPHEN 325 MG/1
975 TABLET ORAL ONCE
Status: DISCONTINUED | OUTPATIENT
Start: 2024-05-03 | End: 2024-05-03 | Stop reason: HOSPADM

## 2024-05-03 RX ORDER — METHYLERGONOVINE MALEATE 0.2 MG/ML
INJECTION INTRAVENOUS
Status: DISCONTINUED
Start: 2024-05-03 | End: 2024-05-03 | Stop reason: WASHOUT

## 2024-05-03 RX ORDER — LIDOCAINE HYDROCHLORIDE 20 MG/ML
INJECTION, SOLUTION INFILTRATION; PERINEURAL AS NEEDED
Status: DISCONTINUED | OUTPATIENT
Start: 2024-05-03 | End: 2024-05-03

## 2024-05-03 RX ORDER — MISOPROSTOL 200 UG/1
400 TABLET ORAL ONCE
Status: COMPLETED | OUTPATIENT
Start: 2024-05-03 | End: 2024-05-03

## 2024-05-03 RX ORDER — DOXYCYCLINE 100 MG/10ML
INJECTION, POWDER, LYOPHILIZED, FOR SOLUTION INTRAVENOUS AS NEEDED
Status: DISCONTINUED | OUTPATIENT
Start: 2024-05-03 | End: 2024-05-03

## 2024-05-03 RX ORDER — DOXYCYCLINE 100 MG/10ML
INJECTION, POWDER, LYOPHILIZED, FOR SOLUTION INTRAVENOUS
Status: COMPLETED
Start: 2024-05-03 | End: 2024-05-03

## 2024-05-03 RX ORDER — ACETAMINOPHEN 325 MG/1
650 TABLET ORAL EVERY 6 HOURS PRN
Qty: 20 TABLET | Refills: 0 | Status: SHIPPED | OUTPATIENT
Start: 2024-05-03 | End: 2024-05-13

## 2024-05-03 RX ORDER — SUCCINYLCHOLINE CHLORIDE 20 MG/ML
INJECTION INTRAMUSCULAR; INTRAVENOUS AS NEEDED
Status: DISCONTINUED | OUTPATIENT
Start: 2024-05-03 | End: 2024-05-03

## 2024-05-03 RX ORDER — FENTANYL CITRATE 50 UG/ML
INJECTION, SOLUTION INTRAMUSCULAR; INTRAVENOUS AS NEEDED
Status: DISCONTINUED | OUTPATIENT
Start: 2024-05-03 | End: 2024-05-03

## 2024-05-03 RX ORDER — MISOPROSTOL 200 UG/1
TABLET ORAL
Status: DISCONTINUED
Start: 2024-05-03 | End: 2024-05-03 | Stop reason: HOSPADM

## 2024-05-03 RX ORDER — PROPOFOL 10 MG/ML
INJECTION, EMULSION INTRAVENOUS AS NEEDED
Status: DISCONTINUED | OUTPATIENT
Start: 2024-05-03 | End: 2024-05-03

## 2024-05-03 RX ORDER — KETOROLAC TROMETHAMINE 30 MG/ML
INJECTION, SOLUTION INTRAMUSCULAR; INTRAVENOUS AS NEEDED
Status: DISCONTINUED | OUTPATIENT
Start: 2024-05-03 | End: 2024-05-03

## 2024-05-03 RX ORDER — SODIUM CHLORIDE 0.9 G/100ML
IRRIGANT IRRIGATION AS NEEDED
Status: DISCONTINUED | OUTPATIENT
Start: 2024-05-03 | End: 2024-05-03 | Stop reason: HOSPADM

## 2024-05-03 RX ORDER — SODIUM CHLORIDE, SODIUM LACTATE, POTASSIUM CHLORIDE, CALCIUM CHLORIDE 600; 310; 30; 20 MG/100ML; MG/100ML; MG/100ML; MG/100ML
100 INJECTION, SOLUTION INTRAVENOUS CONTINUOUS
Status: DISCONTINUED | OUTPATIENT
Start: 2024-05-03 | End: 2024-05-03 | Stop reason: HOSPADM

## 2024-05-03 RX ORDER — ONDANSETRON HYDROCHLORIDE 2 MG/ML
INJECTION, SOLUTION INTRAVENOUS AS NEEDED
Status: DISCONTINUED | OUTPATIENT
Start: 2024-05-03 | End: 2024-05-03

## 2024-05-03 RX ORDER — MIDAZOLAM HYDROCHLORIDE 1 MG/ML
INJECTION INTRAMUSCULAR; INTRAVENOUS AS NEEDED
Status: DISCONTINUED | OUTPATIENT
Start: 2024-05-03 | End: 2024-05-03

## 2024-05-03 RX ORDER — ONDANSETRON HYDROCHLORIDE 2 MG/ML
4 INJECTION, SOLUTION INTRAVENOUS ONCE AS NEEDED
Status: CANCELLED | OUTPATIENT
Start: 2024-05-03

## 2024-05-03 RX ORDER — ROCURONIUM BROMIDE 10 MG/ML
INJECTION, SOLUTION INTRAVENOUS AS NEEDED
Status: DISCONTINUED | OUTPATIENT
Start: 2024-05-03 | End: 2024-05-03

## 2024-05-03 RX ORDER — CELECOXIB 200 MG/1
400 CAPSULE ORAL ONCE
Status: DISCONTINUED | OUTPATIENT
Start: 2024-05-03 | End: 2024-05-03 | Stop reason: HOSPADM

## 2024-05-03 RX ORDER — LIDOCAINE HYDROCHLORIDE 10 MG/ML
0.1 INJECTION, SOLUTION EPIDURAL; INFILTRATION; INTRACAUDAL; PERINEURAL ONCE
Status: CANCELLED | OUTPATIENT
Start: 2024-05-03 | End: 2024-05-03

## 2024-05-03 RX ORDER — LIDOCAINE HYDROCHLORIDE 10 MG/ML
INJECTION INFILTRATION; PERINEURAL
Status: COMPLETED
Start: 2024-05-03 | End: 2024-05-03

## 2024-05-03 RX ORDER — OXYCODONE HYDROCHLORIDE 5 MG/1
10 TABLET ORAL EVERY 4 HOURS PRN
Status: CANCELLED | OUTPATIENT
Start: 2024-05-03

## 2024-05-03 RX ORDER — MISOPROSTOL 200 UG/1
TABLET ORAL AS NEEDED
Status: DISCONTINUED | OUTPATIENT
Start: 2024-05-03 | End: 2024-05-03 | Stop reason: HOSPADM

## 2024-05-03 RX ORDER — MISOPROSTOL 200 UG/1
TABLET ORAL
Status: COMPLETED
Start: 2024-05-03 | End: 2024-05-03

## 2024-05-03 RX ORDER — FENTANYL CITRATE 50 UG/ML
25 INJECTION, SOLUTION INTRAMUSCULAR; INTRAVENOUS EVERY 5 MIN PRN
Status: CANCELLED | OUTPATIENT
Start: 2024-05-03

## 2024-05-03 RX ORDER — OXYCODONE HYDROCHLORIDE 5 MG/1
5 TABLET ORAL EVERY 4 HOURS PRN
Status: CANCELLED | OUTPATIENT
Start: 2024-05-03

## 2024-05-03 RX ORDER — SODIUM CHLORIDE, SODIUM LACTATE, POTASSIUM CHLORIDE, CALCIUM CHLORIDE 600; 310; 30; 20 MG/100ML; MG/100ML; MG/100ML; MG/100ML
100 INJECTION, SOLUTION INTRAVENOUS CONTINUOUS
Status: CANCELLED | OUTPATIENT
Start: 2024-05-03

## 2024-05-03 RX ORDER — VASOPRESSIN 20 U/ML
INJECTION PARENTERAL
Status: DISCONTINUED
Start: 2024-05-03 | End: 2024-05-03 | Stop reason: HOSPADM

## 2024-05-03 RX ORDER — IBUPROFEN 600 MG/1
600 TABLET ORAL EVERY 6 HOURS PRN
Qty: 20 TABLET | Refills: 0 | Status: SHIPPED | OUTPATIENT
Start: 2024-05-03 | End: 2024-05-13

## 2024-05-03 RX ORDER — ALBUTEROL SULFATE 90 UG/1
AEROSOL, METERED RESPIRATORY (INHALATION) AS NEEDED
Status: DISCONTINUED | OUTPATIENT
Start: 2024-05-03 | End: 2024-05-03

## 2024-05-03 RX ORDER — ONDANSETRON 4 MG/1
4 TABLET, FILM COATED ORAL EVERY 6 HOURS PRN
Qty: 20 TABLET | Refills: 0 | Status: SHIPPED | OUTPATIENT
Start: 2024-05-03 | End: 2024-05-10

## 2024-05-03 RX ADMIN — ONDANSETRON 4 MG: 2 INJECTION INTRAMUSCULAR; INTRAVENOUS at 13:53

## 2024-05-03 RX ADMIN — ALBUTEROL SULFATE 2 PUFF: 90 AEROSOL, METERED RESPIRATORY (INHALATION) at 13:54

## 2024-05-03 RX ADMIN — FENTANYL CITRATE 50 MCG: 50 INJECTION, SOLUTION INTRAMUSCULAR; INTRAVENOUS at 14:07

## 2024-05-03 RX ADMIN — SUCCINYLCHOLINE CHLORIDE 120 MG: 20 INJECTION, SOLUTION INTRAMUSCULAR; INTRAVENOUS at 12:31

## 2024-05-03 RX ADMIN — KETOROLAC TROMETHAMINE 30 MG: 30 INJECTION, SOLUTION INTRAMUSCULAR; INTRAVENOUS at 14:08

## 2024-05-03 RX ADMIN — MISOPROSTOL 400 MCG: 200 TABLET ORAL at 09:05

## 2024-05-03 RX ADMIN — FENTANYL CITRATE 50 MCG: 50 INJECTION, SOLUTION INTRAMUSCULAR; INTRAVENOUS at 12:31

## 2024-05-03 RX ADMIN — SODIUM CHLORIDE, POTASSIUM CHLORIDE, SODIUM LACTATE AND CALCIUM CHLORIDE: 600; 310; 30; 20 INJECTION, SOLUTION INTRAVENOUS at 12:19

## 2024-05-03 RX ADMIN — PROPOFOL 200 MG: 10 INJECTION, EMULSION INTRAVENOUS at 12:31

## 2024-05-03 RX ADMIN — ROCURONIUM 30 MG: 50 INJECTION, SOLUTION INTRAVENOUS at 12:53

## 2024-05-03 RX ADMIN — MIDAZOLAM HYDROCHLORIDE 2 MG: 1 INJECTION, SOLUTION INTRAMUSCULAR; INTRAVENOUS at 12:19

## 2024-05-03 RX ADMIN — LIDOCAINE HYDROCHLORIDE 70 MG: 20 INJECTION, SOLUTION INFILTRATION; PERINEURAL at 12:31

## 2024-05-03 RX ADMIN — DOXYCYCLINE 200 MG: 100 INJECTION, POWDER, LYOPHILIZED, FOR SOLUTION INTRAVENOUS at 12:40

## 2024-05-03 RX ADMIN — ALBUTEROL SULFATE 5 PUFF: 90 AEROSOL, METERED RESPIRATORY (INHALATION) at 12:50

## 2024-05-03 RX ADMIN — DEXAMETHASONE SODIUM PHOSPHATE 4 MG: 4 INJECTION INTRA-ARTICULAR; INTRALESIONAL; INTRAMUSCULAR; INTRAVENOUS; SOFT TISSUE at 12:56

## 2024-05-03 RX ADMIN — SUGAMMADEX 200 MG: 100 INJECTION, SOLUTION INTRAVENOUS at 13:54

## 2024-05-03 SDOH — HEALTH STABILITY: MENTAL HEALTH: CURRENT SMOKER: 0

## 2024-05-03 ASSESSMENT — COLUMBIA-SUICIDE SEVERITY RATING SCALE - C-SSRS
6. HAVE YOU EVER DONE ANYTHING, STARTED TO DO ANYTHING, OR PREPARED TO DO ANYTHING TO END YOUR LIFE?: NO
1. IN THE PAST MONTH, HAVE YOU WISHED YOU WERE DEAD OR WISHED YOU COULD GO TO SLEEP AND NOT WAKE UP?: NO
2. HAVE YOU ACTUALLY HAD ANY THOUGHTS OF KILLING YOURSELF?: NO

## 2024-05-03 ASSESSMENT — PAIN SCALES - GENERAL
PAINLEVEL_OUTOF10: 0 - NO PAIN

## 2024-05-03 ASSESSMENT — PAIN - FUNCTIONAL ASSESSMENT
PAIN_FUNCTIONAL_ASSESSMENT: 0-10

## 2024-05-03 NOTE — H&P
[facility] H&P Procedure    Procedure:   1.) Dilation and Evacuation  2.) IUP at 17.6wga  3.) Pentalogy of Sayra      HPI: Nathan Jolly is a 33 y.o.  at 17w4d here for D&E in the setting of abnormal fetal ultrasound findings concerning for pentalogy of sayra. Patient now s/p laminaria placement on  by Dr. Moore. Consents signed outpatient.       Pregnancy Problems (from 24 to present)       Problem Noted Resolved    Abnormal fetal ultrasound 3/28/2024 by HANS Penny, APRN-CNP No    Overview Signed 3/28/2024 12:33 PM by HANS Penny, APRN-CNP     3/20/24 US: Posterior placenta accessible by CVS; Likely single umbilical artery; Large omphalocoele containing liver and bowel; Slight growth lag; Nasal bone was suboptimal; ductus venosus shows reversal    Normal cfDNA on 3/26/24         Multigravida of advanced maternal age in first trimester ([facility]) 3/3/2024 by HANS Feliciano No    Obesity in pregnancy ([facility]) 8/15/2023 by Ronna Emanuel MD No                Obstetrical History   OB History          4    Para   3    Term   3       0    AB   0    Living   3         SAB   0    IAB   0    Ectopic   0    Multiple   0    Live Births   3                 Past Medical History  Past Medical History:   Diagnosis Date    Cellulitis of left toe 2020    Elevated glucose tolerance test     Irregular menstrual cycle     Plantar fasciitis     Postpartum depression     Retained placenta ([facility])     Retained placenta without hemorrhage ([facility])     Retained placenta        Past Surgical History   Past Surgical History:   Procedure Laterality Date    DILATION AND CURETTAGE OF UTERUS  2015    Dilation And Curettage       Social History  Social History     Socioeconomic History    Marital status:      Spouse name: Not on file    Number of children: Not on file    Years of education: Not on file    Highest education level: Not on file   Occupational  "History    Occupation: RN     Employer: [employer]     Comment: Informatics   Tobacco Use    Smoking status: Never    Smokeless tobacco: Never   Vaping Use    Vaping status: Never Used   Substance and Sexual Activity    Alcohol use: Never    Drug use: Never    Sexual activity: Yes     Partners: Male   Other Topics Concern    Not on file   Social History Narrative    Not on file     Social Determinants of Health     Financial Resource Strain: Not on file   Food Insecurity: Not on file   Transportation Needs: Not on file   Physical Activity: Not on file   Stress: Not on file   Social Connections: Not on file   Intimate Partner Violence: Not on file       Allergies  No Known Allergies    Medications  No medications prior to admission.       OBJECTIVE:   LMP 2023 (Exact Date)    BP  Min: 114/72  Max: 114/72    Physical exam:  General:  AAOx3, No acute distress  Cardiovascular: Warm and well perfused  Respiratory: Normal respiratory effort   Abdominal:  Soft, gravid, non-tender, no rebound or guarding, no palpable contractions   Back: No CVA tenderness  Extremities: Warm, well perfused, no edema, no calf tenderness       Labs:   Labs in chart were reviewed.  No results found for: \"ABO\", \"LABRH\", \"ABSCRN\"  No results found for: \"WBC\", \"HGB\", \"HCT\", \"PLT\"  No results found for: \"GRPBSTREP\"  No results found for: \"GLUCOSE\", \"NA\", \"K\", \"CL\", \"CO2\", \"ANIONGAP\", \"BUN\", \"CREATININE\", \"EGFR\", \"CALCIUM\", \"ALBUMIN\", \"PROT\", \"ALKPHOS\", \"ALT\", \"AST\", \"BILITOT\"  No results found for: \"UTPCR\"  No results found for: \"APTT\", \"PROTIME\", \"INR\"  No results found for: \"FIBRINOGEN\"  No results found for: \"BNP\"    ASSESSMENT AND PLAN:     33 y.o.  at 17w6d [facility] for D&E in the setting of [facility].    Dilation and Evacuation  -Consented outpatient, reviewed in chart. Patient desires pregnancy termination.  - 24hour consent and HB 59 forms filled out on 24  -CBC, T&S collected  - Laminaria in " place  - T&C 1 unit pRBC      Pt seen and discussed with MFM Attending, Dr. Moore.     Ryan Jack MD   [facility]  Pager [phone]     Active Problems:    Fetal abnormality affecting management of mother ([facility])    Encounter for elective termination of pregnancy

## 2024-05-03 NOTE — ANESTHESIA PROCEDURE NOTES
Airway  Date/Time: 5/3/2024 12:35 PM  Urgency: elective    Airway not difficult    Staffing  Performed: ALLA   Authorized by: Concepción Carmona MD    Performed by: ALLA Khan  Patient location during procedure: OR    Indications and Patient Condition  Indications for airway management: anesthesia and airway protection  Spontaneous ventilation: present  Sedation level: deep  Preoxygenated: yes  Patient position: sniffing  Mask difficulty assessment: 0 - not attempted  Planned trial extubation    Final Airway Details  Final airway type: endotracheal airway      Successful airway: ETT  Cuffed: yes   Successful intubation technique: direct laryngoscopy  Facilitating devices/methods: intubating stylet  Endotracheal tube insertion site: oral  Blade: Leni  Blade size: #3  ETT size (mm): 7.0  Cormack-Lehane Classification: grade I - full view of glottis  Placement verified by: chest auscultation and capnometry   Measured from: lips  ETT to lips (cm): 20  Number of attempts at approach: 1  Ventilation between attempts: none  Number of other approaches attempted: 0    Additional Comments  RSI

## 2024-05-03 NOTE — OP NOTE
Date: 5/3/2024  OR Location: [facility] OR    Name: Nathan Jolly, : 1990, Age: 33 y.o., MRN: [id number], Sex: female    Diagnosis  Pre-op Diagnosis     * Fetal abnormality affecting management of mother, single or unspecified fetus (HHS-HCC) [O35.9XX0]     * Encounter for elective termination of pregnancy [Z33.2] Post-op Diagnosis     * Fetal abnormality affecting management of mother, single or unspecified fetus (HHS-HCC) [O35.9XX0]     * Encounter for elective termination of pregnancy [Z33.2]     Procedures  Dilation and Evacuation  28239 - NC INDUCED  DILATION & EVACUATION      Surgeons      * Darron Moore - Primary    Resident/Fellow/Other Assistant:  Surgeons and Role:  * No surgeons found with a matching role *    Procedure Summary  Anesthesia: General  ASA: II  Anesthesia Staff: Anesthesiologist: Concepción Carmona MD  C-AA: ALLA Khan  Estimated Blood Loss: 400mL  Intra-op Medications:   Administrations occurring from 1155 to 1305 on 24:   Medication Name Total Dose   lactated Ringer's infusion Cannot be calculated   doxycycline (Vibramycin) 200 mg in dextrose 5 % in water (D5W) 250 mL  mg   doxycycline (Vibramycin) injection  - Omnicell Override Pull Cannot be calculated              Anesthesia Record               Intraprocedure I/O Totals          Intake    lactated Ringer's infusion 700.00 mL    doxycycline (Vibramycin) 200 mg in dextrose 5 % in water (D5W) 250 mL .00 mL    Total Intake 950 mL          Specimen: No specimens collected     Staff:   Circulator: Nazanin Penny RN; Siri Gallardo RN  Relief Circulator: Agueda Syed RN  Scrub Person: Cinthia Mccabe RN         Procedure Details:  The patient was seen in the preoperative area. The site of surgery was properly noted/marked if necessary per policy. The patient has been actively warmed in preoperative area. Preoperative antibiotics have been ordered and given within 1 hours of incision. Venous  thrombosis prophylaxis are not indicated.    Findings: Normal appearing external genitalia, vagina, cervix visually 1cm dilation s/p laminaria removal with minimal bleeding at the cervical os. IUP present on BSUS.    Complications:  None; patient tolerated the procedure well.     Disposition: PACU - hemodynamically stable.  Condition: stable      PROCEDURE:    The patient was taken to the operating room and placed in the dorsal supine position on the operating table.  General endotracheal anesthesia was administered uneventfully.  She was then placed in dorsal lithotomy position in Ryan stirrups.  Exam under anesthesia was performed.  The gauze pad and 3 Dilapan were removed.    The abdomen was prepped with chlorhexidine.  Amniocentesis was performed under ultrasound guidance using a 20 gauge spinal needle.  The placenta was not traversed.  50 mL of clear amniotic fluid was obtained.    The cervix was noted to be approximately 1 cm dilated.  She was prepped and draped in normal sterile fashion as per protocol for this procedure. 400mcg rectal cytotec was placed rectally.  A speculum was placed into the vagina.  The anterior lip of the cervix was grasped with a ring forceps.  The cervix was then dilated with Teague dilators to 55-Citizen of [place].  At that point, the amniotic sac was ruptured using Sopher forceps. Under ultrasound guidance, the Sopher forceps were then used to sever the umbilical cord.  Upon cessation of cardiac activity, under ultrasound guidance, the Sopher forceps were used to extract all of the fetal and placental tissue in serial passes.  After this was completed, a 12 mm curved suction curette was inserted into the uterine cavity and the cavity was evacuated of all remaining blood and products of conception under ultrasound guidance.  At that point, the endometrial echo appeared thin consistent with a complete .  Gentle sharp curettage as performed to confirm removal of all pregnancy tissue.   Following this the endometrial stripe was again assessed and appeared thin.  The uterine tone and bleeding were appropriate.  All instruments were removed from the uterus, cervix, and vagina. An additional; 400mcg of cytotec were administered rectally for a total of 800 intraoperatively.  The products of conception were evaluated and the presence of the calvarium, thorax/spine, extremities x4, and an appropriate amount of fetal and placental tissue were present/confirmed.  The tissue was collected for pathologic evaluation.

## 2024-05-03 NOTE — ANESTHESIA POSTPROCEDURE EVALUATION
Patient: Nathan Jolly    Procedure Summary       Date: 05/03/24 Room / Location: [facility] OR 04 / [facility] [place]    Anesthesia Start: 1219 Anesthesia Stop: 1417    Procedure: Dilation and Evacuation (Vagina ) Diagnosis:       Fetal abnormality affecting management of mother, single or unspecified fetus (HHS-HCC)      Encounter for elective termination of pregnancy      (Fetal abnormality affecting management of mother, single or unspecified fetus (HHS-HCC) [O35.9XX0])      (Encounter for elective termination of pregnancy [Z33.2])    Surgeons: Darron Moore MD Responsible Provider: Concepción Carmona MD    Anesthesia Type: general ASA Status: 2            Anesthesia Type: general    Vitals Value Taken Time   /81 05/03/24 1428   Temp 36.6 °C (97.9 °F) 05/03/24 1413   Pulse 93 05/03/24 1428   Resp 14 05/03/24 1428   SpO2 95 % 05/03/24 1428       Anesthesia Post Evaluation    Patient participation: complete - patient participated  Level of consciousness: awake and alert  Pain management: adequate  Airway patency: patent  Cardiovascular status: acceptable  Respiratory status: acceptable  Hydration status: acceptable  Postoperative Nausea and Vomiting: none    No notable events documented.

## 2024-05-03 NOTE — PERIOPERATIVE NURSING NOTE
PRODUCTS OF CONCEPTION SENT DIRECTLY TO MORTICIAN PER PATIENT REQUEST AND SIGNATURE. NO PATHOLOGY REQUISITION NEEDED

## 2024-05-03 NOTE — ANESTHESIA PREPROCEDURE EVALUATION
Patient: Nathan Jolly        Procedure Information       Date/Time: 05/03/24 1155    Procedure: Dilation and Evacuation    Location: [facility] OR 04 / [facility] OR    Surgeons: Darron Moore MD          Vitals:    05/03/24 0850   BP: 126/69   Pulse: 90   Resp: 15   Temp: 36.3 °C (97.3 °F)   SpO2: 95%       Past Surgical History:   Procedure Laterality Date   • DILATION AND CURETTAGE OF UTERUS  12/21/2015    Dilation And Curettage     Past Medical History:   Diagnosis Date   • Cellulitis of left toe 02/20/2020   • Elevated glucose tolerance test    • Irregular menstrual cycle    • Plantar fasciitis    • Postpartum depression    • Retained placenta (HHS-HCC)    • Retained placenta without hemorrhage (HHS-HCC)     Retained placenta       Current Facility-Administered Medications:   •  acetaminophen (Tylenol) tablet 975 mg, 975 mg, oral, Once, Ryan Jack MD  •  carboprost (Hemabate) injection  - Omnicell Override Pull, , , ,   •  celecoxib (CeleBREX) capsule 400 mg, 400 mg, oral, Once, Ryan Jack MD  •  gabapentin (Neurontin) tablet 600 mg, 600 mg, oral, Once, Ryan Jack MD  •  lactated Ringer's infusion, 100 mL/hr, intravenous, Continuous, Concepción Carmona MD  •  lidocaine (Xylocaine) injection  - Omnicell Override Pull, , , ,   •  methylergonovine (Methergine) injection  - Omnicell Override Pull, , , ,   •  miSOPROStoL (Cytotec) tablet  - Omnicell Override Pull, , , ,   •  vasopressin (Vasostrict) injection  - Omnicell Override Pull, , , ,   Prior to Admission medications    Medication Sig Start Date End Date Taking? Authorizing Provider   prenatal no115/iron/folic acid (PRENATAL 19 ORAL) Take by mouth.   Yes Historical Provider, MD     No Known Allergies  Social History     Tobacco Use   • Smoking status: Never   • Smokeless tobacco: Never   Substance Use Topics   • Alcohol use: Never         Chemistry    Lab Results   Component Value Date/Time     10/06/2023 0901    K 4.4  "10/06/2023 0901     (H) 10/06/2023 0901    CO2 24 10/06/2023 0901    BUN 11 10/06/2023 0901    CREATININE 0.77 10/06/2023 0901    Lab Results   Component Value Date/Time    CALCIUM 9.2 10/06/2023 0901    ALKPHOS 48 10/06/2023 0901    AST 16 10/06/2023 0901    ALT 17 10/06/2023 0901    BILITOT 0.3 10/06/2023 0901          Lab Results   Component Value Date/Time    WBC 8.4 05/02/2024 1511    HGB 11.9 (L) 05/02/2024 1511    HCT 36.1 05/02/2024 1511     05/02/2024 1511     No results found for: \"PROTIME\", \"PTT\", \"INR\"  No results found for this or any previous visit (from the past 4464 hour(s)).  No results found for this or any previous visit from the past 1095 days.        Relevant Problems   Neuro   (+) Current moderate episode of major depressive disorder (Multi)   (+) Generalized anxiety disorder      Endocrine   (+) Obesity in pregnancy (HHS-HCC)      HEENT   (+) Chronic sinusitis      GYN   (+) Multigravida of advanced maternal age in first trimester (HHS-HCC)   Fetal abnormality    Clinical information reviewed:   Tobacco  Allergies  Meds   Med Hx  Surg Hx  OB Status  Fam Hx  Soc   Hx        NPO Detail:  NPO/Void Status  Carbohydrate Drink Given Prior to Surgery? : N  Date of Last Liquid: 05/02/24  Time of Last Liquid: 1700  Date of Last Solid: 05/02/24  Time of Last Solid: 1700  Last Intake Type: Clear fluids  Time of Last Void: 0730         OB/GYN     Physical Exam    Airway  Mallampati: I  TM distance: >3 FB  Neck ROM: full     Cardiovascular   Rhythm: regular  Rate: normal     Dental - normal exam     Pulmonary   Breath sounds clear to auscultation     Abdominal        Anesthesia Plan    History of general anesthesia?: yes  History of complications of general anesthesia?: no    ASA 2     general     The patient is not a current smoker.    intravenous induction   Anesthetic plan and risks discussed with patient.    Plan discussed with CAA and attending.    "

## 2024-05-03 NOTE — PROGRESS NOTES
"Nathan Jolly is a 33 year old, , female who was 17 5/7 weeks gestation at the time of our appointment with an EDC of 2024.  She was seen today for follow up genetic counseling due to additional fetal concerns noted on recent ultrasound.         PAST HISTORY:  Patient was identified as having fetal omphalocele via first trimester imaging, as well as concern for 6 day fetal growth lag. While [facility] cfDNA screening was negative, patient expressed interest in diagnostic genetic testing and was consented to whole genome sequencing with amniocentesis planned last week. She underwent fetal echocardiogram and early fetal anatomy ultrasound at 16 6/7 weeks gestation (the day after genetic counseling and consents for testing were signed); and additional ultrasound concerns were noted as follows:    \"Resolution was limited however a large ompaholocele was seen containing the entire liver, stomach and bowel. The anterior left diaphragm is not seen and is likely absent as the esophagus is seen going though this area. The apex of the heart is at this defect and the pericardium appears to be just within the omphalocoele. This is a form of Pentalogy of Sayra.\" Fetal growth lag is now 9 days.  She has elected to proceed with termination of pregnancy with dilators being placed today and D&E tomorrow.      Patient also reports that she and her  had carrier screening performed through the [facility] and were considered \"compatible\". This screening is available to couples of Sikh ancestry and screens for a common set of autosomal recessive disorders. Actual carrier screening results are not reported; the couple is informed of whether or not they are considered \"compatible\" based on these screening results.      FAMILY HISTORY  Medical and family histories were reviewed and the following concerns were previously reported:     Patient   -personal history of anxiety and depression  -anxiety/depression " reported in patient's 3 siblings  -sister has a sone and a daughter with mild autism  -another sister has a son with type I diabetes  -paternal aunt with Parkinson's  -mother and sister have hypothyroidism     Patient's reproductive partner  -brother  of colon cancer at age 34  -sister has a son and daughter with moderate autism  -mother has hypothyroidism  -paternal uncle has autism        The remainder of the family history was negative for birth defects, intellectual disability, recurrent pregnancy loss, or recognized inherited conditions.  Consanguinity denied.              SELF REPORTED RACE/ETHNICITY:  Patient: Eastern , Restorationist ancestry reported  Patient's partner: German, Berm[place]ian, Restorationist ancestry reported      COUNSELING:     The following information was discussed with your patient today:     We again reviewed that approximately 30% of omphaloceles are associated with a chromosome abnormality; however risk of chromosome abnormality, or other single gene disorder is likely to be higher in the context of other fetal abnormalities. Most recent ultrasounds suggest portion of diaphragm being absent, possible VSD, fetal growth restriction. The findings may possibly represent a variant of pentalogy of isra, which has been associated with some common aneuploidies (for which the patient has already had negative screening).  The possibility of Triploidy, or other single gene disorders, as well as Jonny Wiedemann syndrome are also still possible.     2. If genetic testing is still desired, whole genome sequencing may be considered as well as methylation testing for Jonny Wiedemann syndrome. This testing may be performed on products of conception (fetal or placental tissue) or via amniocentesis performed prior to D&E. As patient wishes to keep placental and fetal products in tact as much as possible for burial due to Scientologist beliefs, amniocentesis is preferred.                             DISPOSITION:  The patient stated that she understood the above information and plans to proceed with amniocentesis for whole genome sequencing, and possible reflex to BWS methylation testing if WGS is negative. Maternal blood will be sent with paternal saliva sample.                      Lubna Rutledge MS, Licensed Genetic Counselor spent 20 minutes with the patient with greater than 50% of the time spent in face to face counseling.      Thank you for allowing us to participate in the care of your patient.  Should you or your patient have any questions, please do not hesitate to contact our office at [phone].        Sincerely,        Lubna Rutledge MS  Licensed Genetic Counselor

## 2024-05-03 NOTE — LETTER
Nathan Jolly was diagnosed with an intrauterine embryonic/fetal demise on 05/03/24 measuring 17.6 GA by ultrasound.    Dr. Ryan Jack MD  Electronically signed to expedite processing.      Patient desires:  [] Hospital disposition   [] Patient desires to be notified of annual [facility] service - Parent Bereavement Programs notified  [x] Private disposition - Authorization for Release form signed with patient and uploaded to patient's chart  [] Patient desires a fetal death certificate  A pregnancy loss of less than 20 weeks can be recognized in the [facility] with a Fetal Death Certificate.  You or the father of the Baby may apply for a Fetal Death Certificate through the [facility].  This written statement of early pregnancy loss must be submitted with the application.  There may be a fee associated with this service.  Other requirements may apply.

## 2024-05-15 ENCOUNTER — TELEPHONE (OUTPATIENT)
Dept: OBSTETRICS AND GYNECOLOGY | Facility: CLINIC | Age: 34
End: 2024-05-15
Payer: COMMERCIAL

## 2024-05-15 LAB — SCAN RESULT: NORMAL

## 2024-05-16 ENCOUNTER — APPOINTMENT (OUTPATIENT)
Dept: PEDIATRIC CARDIOLOGY | Facility: CLINIC | Age: 34
End: 2024-05-16
Payer: COMMERCIAL

## 2024-05-21 ENCOUNTER — OFFICE VISIT (OUTPATIENT)
Dept: OBSTETRICS AND GYNECOLOGY | Facility: CLINIC | Age: 34
End: 2024-05-21
Payer: COMMERCIAL

## 2024-05-21 VITALS — SYSTOLIC BLOOD PRESSURE: 124 MMHG | HEIGHT: 64 IN | BODY MASS INDEX: 35.65 KG/M2 | DIASTOLIC BLOOD PRESSURE: 82 MMHG

## 2024-05-21 DIAGNOSIS — Z30.011 ENCOUNTER FOR INITIAL PRESCRIPTION OF CONTRACEPTIVE PILLS: Primary | ICD-10-CM

## 2024-05-21 PROBLEM — O28.3 ABNORMAL FETAL ULTRASOUND: Status: RESOLVED | Noted: 2024-03-28 | Resolved: 2024-05-21

## 2024-05-21 PROCEDURE — 1036F TOBACCO NON-USER: CPT

## 2024-05-21 PROCEDURE — 99213 OFFICE O/P EST LOW 20 MIN: CPT

## 2024-05-21 RX ORDER — LEVONORGESTREL AND ETHINYL ESTRADIOL 0.15-0.03
1 KIT ORAL DAILY
Qty: 84 TABLET | Refills: 3 | Status: SHIPPED | OUTPATIENT
Start: 2024-05-21 | End: 2025-05-21

## 2024-05-21 ASSESSMENT — PAIN SCALES - GENERAL: PAINLEVEL: 0-NO PAIN

## 2024-05-21 NOTE — PROGRESS NOTES
"Aryan Evans is a 33 y.o. female who presents for contraception counseling.     Sexual Activity: sexually active, male partners; Patient reports 1 partners in the last 12 months.  Pertinent past medical history: none.    OB History          4    Para   3    Term   3       0    AB   0    Living   3         SAB   0    IAB   0    Ectopic   0    Multiple   0    Live Births   3                  Objective   /82   Ht 1.626 m (5' 4\")   LMP 2023 (Exact Date)   Breastfeeding No   BMI 35.65 kg/m²   Physical exam deferred at this time.     Assessment/Plan   Risks, benefits, and expected side effects of available hormonal contraception methods were discussed, including IUDs, Nexplanon, Depo-Provera, vaginal ring, contraception patch, COCs, and POPs. Non-hormonal contraception methods including copper IUD, Phexxi, barrier methods, and fertility awareness were also discussed.     Nathan decided on OCP (estrogen/progesterone).    Nathan was seen today for follow up/ iud insertion.  Diagnoses and all orders for this visit:  Encounter for initial prescription of contraceptive pills (Primary)  -     levonorgestreL-ethinyl estrad (Seasonale) 0.15 mg-30 mcg (91) tablet; Take 1 tablet by mouth once daily.  -     Follow Up In Gynecology; Future     Encouraged to reach out to our office with any questions or concerns.   Encouraged patient to follow up virtually in 3 months to discuss the OCP.     ISABELLE Feliciano-BENNY  "

## 2024-05-23 ENCOUNTER — APPOINTMENT (OUTPATIENT)
Dept: OBSTETRICS AND GYNECOLOGY | Facility: CLINIC | Age: 34
End: 2024-05-23
Payer: COMMERCIAL

## 2024-05-30 ENCOUNTER — TELEMEDICINE CLINICAL SUPPORT (OUTPATIENT)
Dept: GENETICS | Facility: HOSPITAL | Age: 34
End: 2024-05-30
Payer: COMMERCIAL

## 2024-05-30 DIAGNOSIS — Z71.2 ENCOUNTER TO DISCUSS TEST RESULTS: ICD-10-CM

## 2024-05-30 PROCEDURE — 98967 PH1 ASSMT&MGMT NQHP 11-20: CPT | Performed by: GENETIC COUNSELOR, MS

## 2024-05-30 NOTE — PROGRESS NOTES
"Nathan Jolly is a 33 year old,  (1 EAB), female who was seen for genetic counseling during her most recent pregnancy due to multiple fetal abnormalities. She recently underwent pregnancy termination and whole genome sequencing was sent on placental sample with negative test results. Appointment scheduled today to discuss implications of these results.          PAST HISTORY:  Patient was identified as having fetal omphalocele via first trimester imaging, as well as concern for 6 day fetal growth lag. Early fetal anatomy ultrasound at 16 6/7 weeks gestation noted fetal growth restriction (9 day growth lag), large ompaholocele containing the entire liver, stomach and bowel, anterior left diaphragm not seen (suspected to be absent), apex of the heart is at this defect and the pericardium appears to be just within the omphalocele suggesting possible form of Pentalogy of Sayra. D&E was performed at 17 weeks gestation and whole genome sequencing sent.      Patient also reports that she and her  had carrier screening performed through the [facility] and were considered \"compatible\". This screening is available to couples of Taoist ancestry and screens for a common set of autosomal recessive disorders. Actual carrier screening results are not reported; the couple is informed of whether or not they are considered \"compatible\" based on these screening results.      FAMILY HISTORY  Medical and family histories were previously obtained as follows:      Patient   -personal history of anxiety and depression  -anxiety/depression reported in patient's 3 siblings  -sister has a sone and a daughter with mild autism  -another sister has a son with type I diabetes  -paternal aunt with Parkinson's  -mother and sister have hypothyroidism     Patient's reproductive partner  -brother  of colon cancer at age 34  -sister has a son and daughter with moderate autism  -mother has hypothyroidism  -paternal uncle has " autism        The remainder of the family history was negative for birth defects, intellectual disability, recurrent pregnancy loss, or recognized inherited conditions.  Consanguinity denied.              SELF REPORTED RACE/ETHNICITY:  Patient: Eastern , Orthodox ancestry reported  Patient's partner: Czech, Congolese, Orthodox ancestry reported      COUNSELING:     The following information was discussed with your patient today:     We reviewed that whole genome sequencing essentially assesses the entire genome of the fetus/pregnancy to determine if a genetic cause (single gene or copy number variant) can be identified that would be associated with the above ultrasound findings. Negative test results indicate that no genetic cause was found, including no variants of unknown significance in a possible disease related gene. This may be because:  Pregnancy was affected by a sporadic developmental abnormality that does not have a genetic cause   Pregnancy was affected be a genetic condition; however current testing and understanding of genetic information in 2024 was unable to identify the condition due to:   limitations of the testing technology (does not assess for ALL genetic abnormalities, including methylation testing for some forms of Jonny Wiedemann syndrome)  Mutation was identified in a gene with unknown function and therefore was not reported    2. Recurrence risk for a future pregnancy depends on the reason for the fetal abnormalities. In a sporadic developmental condition, risk of a subsequent affected pregnancy is typically low (likely 1% or less). In the event of a genetic etiology with no other affected family members, recurrence risk can range from <1% up to 25%.     3. We also discussed that whole genome sequencing can identify other childhood onset diseases in the pregnancy, as well as mutations associated with adult onset actionable diseases (cancer predisposition syndromes, conditions  associated with early heart disease, etc.) when ACM[facility] reporting is elected. Results were negative for these additional findings in the pregnancy; therefore parental status was not assessed/reported for these findings.     4. Additional carrier testing or healthy exome testing may be considered for patient and her  if they would like more comprehensive genetic testing to assess their carrier status and potential risk of having a child with a recessive genetic condition, and/or whether they are potentially at risk of adult onset genetic disorders.        DISPOSITION:  The patient stated that she understood the above information; they are unsure about their future reproductive plans and defer any additional testing at this time.                       Lubna Rutledge MS, Licensed Genetic Counselor spent 14 minutes with the patient in consultation via telephone.      Thank you for allowing us to participate in the care of your patient.  Should you or your patient have any questions, please do not hesitate to contact our office at [phone].        Sincerely,        Lubna Rutledge MS  Licensed Genetic Counselor

## 2024-06-09 ENCOUNTER — TELEMEDICINE (OUTPATIENT)
Dept: PRIMARY CARE | Facility: CLINIC | Age: 34
End: 2024-06-09
Payer: COMMERCIAL

## 2024-06-09 DIAGNOSIS — J18.9 COMMUNITY ACQUIRED PNEUMONIA, UNSPECIFIED LATERALITY: Primary | ICD-10-CM

## 2024-06-09 PROCEDURE — 1036F TOBACCO NON-USER: CPT | Performed by: NURSE PRACTITIONER

## 2024-06-09 PROCEDURE — 99212 OFFICE O/P EST SF 10 MIN: CPT | Performed by: NURSE PRACTITIONER

## 2024-06-09 PROCEDURE — 3008F BODY MASS INDEX DOCD: CPT | Performed by: NURSE PRACTITIONER

## 2024-06-09 RX ORDER — DOXYCYCLINE 100 MG/1
100 CAPSULE ORAL 2 TIMES DAILY
Qty: 14 CAPSULE | Refills: 0 | Status: SHIPPED | OUTPATIENT
Start: 2024-06-09 | End: 2024-06-16

## 2024-06-09 NOTE — PROGRESS NOTES
"Nathan Jolly is a 33 y.o. female who presents virtually today for sick visit.    I performed this visit using real-time telehealth tools, including an audio/video connection between Nathan Jolly  and myself, Haven Lozano CNP,  within the state of [place].  Consent has been obtained for this visit.  I have verbally confirmed with Nathan Jolly (or parent if under 18) that they are physically located in the [facility] during this virtual visit.  Telemedicine appropriate evaluation completed.  Unable to perform complete physical exam due to virtual visit.      Chief Complaint   Patient presents with    Cough    Fever    chest congestion       Symptoms: Cough w/green mucous, fevers on and off and chest congestion  Pt states she feels \"winded\" at times   Covid test at home 4 days ago---> NEGATIVE     Symptom onset has been acute for a time period of 7-10 days with symptoms not improving.     Severity is described as mild-moderate.     Course of her symptoms over time is constant.    Has taken: Amoxicillin (old Rx) took 2 days worth     No Known Allergies    Review of Systems  ROS was completed and all systems are negative with the exception of what was noted in the the HPI.       Objective   Vitals:  There were no vitals taken for this visit.      Current Outpatient Medications   Medication Instructions    doxycycline (VIBRAMYCIN) 100 mg, oral, 2 times daily, Take with at least 8 ounces (large glass) of water, do not lie down for 30 minutes after    levonorgestreL-ethinyl estrad (Seasonale) 0.15 mg-30 mcg (91) tablet 1 tablet, oral, Daily    prenatal no115/iron/folic acid (PRENATAL 19 ORAL) oral         Physical Exam  Eyes:      Conjunctiva/sclera: Conjunctivae normal.   Pulmonary:      Effort: Pulmonary effort is normal. No respiratory distress.   Neurological:      Mental Status: She is alert and oriented to person, place, and time.   Psychiatric:         Mood and Affect: Mood normal.         Thought Content: " Thought content normal.         Assessment/Plan   Problem List Items Addressed This Visit    None  Visit Diagnoses         Codes    Community acquired pneumonia, unspecified laterality    -  Primary J18.9    Relevant Medications    doxycycline (Vibramycin) 100 mg capsule

## 2024-06-09 NOTE — PATIENT INSTRUCTIONS
"Thank you for seeing me today.  It was a pleasure to meet you!    I have concerns that you may be developing Community Acquired Pneumonia. I will Rx Doxycycline twice daily x 7 days   Please take as directed, you should NEVER have leftover antibiotics.    Tylenol 650-975 mg every 4-6 hours for fever   Buy OTC cough syrup with \"DM\" to help with your congestion    Drink extra fluids, water/Gatorade/propel for hydration with fevers     F/U WITH PCP AS NEEDED   "

## 2024-06-20 ENCOUNTER — APPOINTMENT (OUTPATIENT)
Dept: OBSTETRICS AND GYNECOLOGY | Facility: CLINIC | Age: 34
End: 2024-06-20
Payer: COMMERCIAL

## 2024-07-03 ENCOUNTER — APPOINTMENT (OUTPATIENT)
Dept: OBSTETRICS AND GYNECOLOGY | Facility: CLINIC | Age: 34
End: 2024-07-03
Payer: COMMERCIAL

## 2024-07-17 ENCOUNTER — APPOINTMENT (OUTPATIENT)
Dept: OBSTETRICS AND GYNECOLOGY | Facility: CLINIC | Age: 34
End: 2024-07-17
Payer: COMMERCIAL

## 2024-07-31 ENCOUNTER — APPOINTMENT (OUTPATIENT)
Dept: OBSTETRICS AND GYNECOLOGY | Facility: CLINIC | Age: 34
End: 2024-07-31
Payer: COMMERCIAL

## 2024-08-14 ENCOUNTER — APPOINTMENT (OUTPATIENT)
Dept: OBSTETRICS AND GYNECOLOGY | Facility: CLINIC | Age: 34
End: 2024-08-14
Payer: COMMERCIAL

## 2024-08-28 ENCOUNTER — APPOINTMENT (OUTPATIENT)
Dept: OBSTETRICS AND GYNECOLOGY | Facility: CLINIC | Age: 34
End: 2024-08-28
Payer: COMMERCIAL

## 2024-09-04 ENCOUNTER — APPOINTMENT (OUTPATIENT)
Dept: OBSTETRICS AND GYNECOLOGY | Facility: CLINIC | Age: 34
End: 2024-09-04
Payer: COMMERCIAL

## 2024-09-06 ENCOUNTER — APPOINTMENT (OUTPATIENT)
Dept: OBSTETRICS AND GYNECOLOGY | Facility: CLINIC | Age: 34
End: 2024-09-06
Payer: COMMERCIAL

## 2024-09-06 DIAGNOSIS — Z30.41 ENCOUNTER FOR BIRTH CONTROL PILLS MAINTENANCE: Primary | ICD-10-CM

## 2024-09-06 DIAGNOSIS — R45.89 DEPRESSED MOOD: ICD-10-CM

## 2024-09-06 PROCEDURE — 99212 OFFICE O/P EST SF 10 MIN: CPT

## 2024-09-06 RX ORDER — DROSPIRENONE AND ETHINYL ESTRADIOL 0.02-3(28)
1 KIT ORAL DAILY
Qty: 84 TABLET | Refills: 4 | Status: SHIPPED | OUTPATIENT
Start: 2024-09-06 | End: 2025-09-06

## 2024-09-06 NOTE — PROGRESS NOTES
Aryan Evans is a 33 y.o. female who presents for contraception counseling. Patient reports that she was doing well on the birth control pills but that she started having depressed mood with her periods so she stopped the pills for the time being.     Sexual Activity: sexually active, male partners; Patient reports 1 partners in the last 12 months.  Pertinent past medical history: none    OB History          4    Para   3    Term   3       0    AB   0    Living   3         SAB   0    IAB   0    Ectopic   0    Multiple   0    Live Births   3                Patient reported depressed symptoms with the birth control a couple of week. Patient reports that she hasn't seen anyone for the depression.     Objective   There were no vitals taken for this visit.  Visit completed virtually.         Assessment/Plan   We discussed alternative birth control options. Patient decided on new OCP and plans to meet with psych for concerns of PMDD.     Diagnoses and all orders for this visit:  Encounter for birth control pills maintenance (Primary)  -     drospirenone-ethinyl estradioL (Zayda, 28,) 3-0.02 mg tablet; Take 1 tablet by mouth once daily.  Depressed mood  -     Referral to Psychology; Future       HANS Feliciano

## 2024-09-11 ENCOUNTER — APPOINTMENT (OUTPATIENT)
Dept: OBSTETRICS AND GYNECOLOGY | Facility: CLINIC | Age: 34
End: 2024-09-11
Payer: COMMERCIAL

## 2024-09-12 ENCOUNTER — HOSPITAL ENCOUNTER (OUTPATIENT)
Dept: RADIOLOGY | Facility: HOSPITAL | Age: 34
Discharge: HOME | End: 2024-09-12
Payer: COMMERCIAL

## 2024-09-12 ENCOUNTER — OFFICE VISIT (OUTPATIENT)
Dept: ORTHOPEDIC SURGERY | Facility: HOSPITAL | Age: 34
End: 2024-09-12
Payer: COMMERCIAL

## 2024-09-12 DIAGNOSIS — M79.672 LEFT FOOT PAIN: ICD-10-CM

## 2024-09-12 DIAGNOSIS — S93.402A SPRAIN OF LEFT ANKLE, INITIAL ENCOUNTER: Primary | ICD-10-CM

## 2024-09-12 DIAGNOSIS — M25.572 LEFT ANKLE PAIN, UNSPECIFIED CHRONICITY: ICD-10-CM

## 2024-09-12 PROCEDURE — 99204 OFFICE O/P NEW MOD 45 MIN: CPT

## 2024-09-12 PROCEDURE — 73630 X-RAY EXAM OF FOOT: CPT | Mod: LT

## 2024-09-12 PROCEDURE — 99214 OFFICE O/P EST MOD 30 MIN: CPT

## 2024-09-12 PROCEDURE — 1036F TOBACCO NON-USER: CPT

## 2024-09-12 PROCEDURE — L4361 PNEUMA/VAC WALK BOOT PRE OTS: HCPCS

## 2024-09-12 PROCEDURE — 73630 X-RAY EXAM OF FOOT: CPT | Mod: LEFT SIDE | Performed by: RADIOLOGY

## 2024-09-12 PROCEDURE — 73610 X-RAY EXAM OF ANKLE: CPT | Mod: LT

## 2024-09-12 PROCEDURE — 73610 X-RAY EXAM OF ANKLE: CPT | Mod: LEFT SIDE | Performed by: RADIOLOGY

## 2024-09-12 NOTE — PROGRESS NOTES
PRIMARY CARE PHYSICIAN: Ronna Emanuel MD  REFERRING PROVIDER: No referring provider defined for this encounter.     CONSULT ORTHOPAEDIC: Ankle Evaluation    ASSESSMENT & PLAN    Impression: 33 y.o. female with an acute left ankle sprain    Plan:   I explained to the patient the nature of their diagnosis.  I reviewed their imaging studies with them.    Based on the history, physical exam and imaging studies above, the patient's presentation is consistent with the above diagnosis.  I had a long discussion with the patient regarding their presentation and the treatment options.  We discussed initial nonoperative versus operative management options as well as potential further diagnostic imaging. I reviewed the x-ray images with the patient. At this time I recommend proceeding with conservative management. Patient was placed in a Tall walking boot. She may be weight bearing as tolerated. She may progress out of her boot as her pain improves and transition to a lace up ankle brace. Patient was advised to come out of her boot to ice, elevate and perform gentle ankle range of motion exercises. She will continue with ibuprofen as needed. We discussed that should her pain not improve over the next 3-4 weeks with the above plan she will follow-up with foot/ankle specialist for further evaluation. Patient expressed understanding and was in agreement with the above plan.     Patient was prescribed a CAM walker boot for her left ankle sprain.The patient is ambulatory with or without aid; but, has weakness, instability and/or deformity of their left ankle/foot which requires stabilization from this orthosis to improve their function.      Verbal and written instructions for the use, wear schedule, cleaning and application of this item were given.  Patient was instructed that should the brace result in increased pain, decreased sensation, increased swelling, or an overall worsening of their medical condition, to please contact our  office immediately.     Orthotic management and training was provided for skin care, modifications due to healing tissues, edema changes, interruption in skin integrity, and safety precautions with the orthosis.      At the end of the visit, all questions were answered in full. The patient is in agreement with the plan and recommendations. They will call the office with any questions/concerns.    Note dictated with [employer] software. Completed without full typed error editing and sent to avoid delay.     SUBJECTIVE  CHIEF COMPLAINT:   Chief Complaint   Patient presents with    Left Ankle - Pain    Left Foot - Pain        HPI: Nathan Jolly is a 33 y.o. patient who presents today to the [facility] with left ankle pain. Patient states she inverted the left ankle yesterday while walking down her patio steps. She had immediate pain and swelling. She iced, elevated, wrapped her ankle in an ACE and took ibuprofen which did provide some relief. Pain is worse with ambulation and active ROM of the left ankle. Pain is localized to the medial and lateral ankle.Denies history of ankle injuries. No bruising.     They deny any constant or progressive numbness or tingling in their legs.     REVIEW OF SYSTEMS  Constitutional: See HPI for pain assessment, No significant recent weight gain or loss, no fever or chills  Cardiovascular: No chest pain, shortness of breath  Respiratory: No difficulty breathing, no cough  Gastrointestinal: No nausea, vomiting, diarrhea, constipation  Musculoskeletal: Noted in HPI, positive for pain, restricted motion, stiffness  Integumentary: No rashes, easy bruising or skin lesions  Neurological: No headache, no numbness or tingling in extremities  Psychiatric: No mood changes or memory changes. No social issues  Heme/Lymph: No excessive swelling, easy bruising or excessive bleeding  ENT: No hearing changes, no nosebleeds  Eyes: No vision changes    Past Medical History:   Diagnosis Date     Cellulitis of left toe 02/20/2020    Elevated glucose tolerance test     Irregular menstrual cycle     Plantar fasciitis     Postpartum depression     Retained placenta (HHS-HCC)     Retained placenta without hemorrhage (HHS-HCC)     Retained placenta        No Known Allergies     Past Surgical History:   Procedure Laterality Date    DILATION AND CURETTAGE OF UTERUS  12/21/2015    Dilation And Curettage        Family History   Problem Relation Name Age of Onset    Hypothyroidism Mother      Other (Non hodgkins lymphoma) Father MM     Depression Father MM     Diabetes Father MM     Heart failure Father MM     Hypothyroidism Sister      Parkinsonism Other Aunt         Social History     Socioeconomic History    Marital status:      Spouse name: Not on file    Number of children: Not on file    Years of education: Not on file    Highest education level: Not on file   Occupational History    Occupation: RN     Employer: [employer]     Comment: Informatics   Tobacco Use    Smoking status: Never    Smokeless tobacco: Never   Vaping Use    Vaping status: Never Used   Substance and Sexual Activity    Alcohol use: Never    Drug use: Never    Sexual activity: Yes     Partners: Male   Other Topics Concern    Not on file   Social History Narrative    Not on file     Social Determinants of Health     Financial Resource Strain: Not on file   Food Insecurity: Not on file   Transportation Needs: Not on file   Physical Activity: Not on file   Stress: Not on file   Social Connections: Not on file   Intimate Partner Violence: Not on file        CURRENT MEDICATIONS:   Current Outpatient Medications   Medication Sig Dispense Refill    drospirenone-ethinyl estradioL (Zayda, 28,) 3-0.02 mg tablet Take 1 tablet by mouth once daily. 84 tablet 4    levonorgestreL-ethinyl estrad (Seasonale) 0.15 mg-30 mcg (91) tablet Take 1 tablet by mouth once daily. 84 tablet 3    prenatal no115/iron/folic acid (PRENATAL 19 ORAL) Take by  "mouth.       No current facility-administered medications for this visit.        OBJECTIVE    PHYSICAL EXAM      5/2/2024     1:10 PM 5/3/2024     8:50 AM 5/3/2024     2:13 PM 5/3/2024     2:28 PM 5/3/2024     2:43 PM 5/3/2024     3:02 PM 5/21/2024     3:16 PM   Vitals   Systolic 114 126 116 128 120 120 124   Diastolic 72 69 58 81 66 68 82   Heart Rate  90 91 93 90 90    Temp  36.3 °C (97.3 °F) 36.6 °C (97.9 °F)   36.6 °C (97.9 °F)    Resp  15 14 14 15 14    Height (in)  1.55 m (5' 1.02\")     1.626 m (5' 4\")   Weight (lb) 207 207.67     --   BMI 37.95 kg/m2 39.21 kg/m2     35.65 kg/m2   BSA (m2) 2.03 m2 2.01 m2     2.06 m2   Visit Report       Report      There is no height or weight on file to calculate BMI.    GENERAL: A/Ox3, NAD. Appears healthy, well nourished  PSYCHIATRIC: Mood stable, appropriate memory recall  EYES: EOM intact, no scleral icterus  CARDIOVASCULAR: Palpable peripheral pulses  LUNGS: Breathing non-labored on room air  SKIN: No open lesions, rashes, ulcerations     MUSCULOSKELETAL:  Laterality: left Ankle Exam  - Skin intact  - No erythema or warmth  - No ecchymosis. Moderate soft tissue swelling medial ankle  - Alignment: neutral  - Palpation: Positive TTP over ATFL and Deltoid. Mild TTP medial malleolus. No tenderness to palpation over the Achilles, posterior tibial tendon, peroneal tendon, talus or navicular. No tenderness to palpation over heel, plantar arch, base of 1st metatarsal/2nd metatarsal, 5th metatarsal, medial cuneiform, navicular, 1st MTP joint or 2nd or 3rd intermetarsal space.  - ROM: Normal ROM in ankle plantar flexion, dorsiflexion, inversion and eversion however painful;  - Strength: Normal strength in ankle plantar flexion, dorsiflexion, inversion and eversion; normal strength with great toe flexion/extension  - Stability:        Anterior drawer stable       Inversion/Eversion stable  - Achilles tendon palpable and intact; Travis test negative  - Gait: antalgic  - Special " Tests: Negative Squeeze test, no pain over fibular head    NEUROVASCULAR:  - Neurovascular Status: sensation intact to light touch distally, lower extremity motor intact  - Capillary refill brisk at extremities, Bilateral dorsalis pedis pulse 2+    Imaging: Multiple views of the affected left ankle(s) demonstrate: no acute osseous abnormality, no fracture, ankle mortise well aligned.   X-rays were personally reviewed and interpreted by me.  Radiology reports were reviewed by me as well, if readily available at the time.

## 2024-09-18 ENCOUNTER — APPOINTMENT (OUTPATIENT)
Dept: OBSTETRICS AND GYNECOLOGY | Facility: CLINIC | Age: 34
End: 2024-09-18
Payer: COMMERCIAL

## 2024-09-25 ENCOUNTER — APPOINTMENT (OUTPATIENT)
Dept: OBSTETRICS AND GYNECOLOGY | Facility: CLINIC | Age: 34
End: 2024-09-25
Payer: COMMERCIAL

## 2024-10-02 ENCOUNTER — APPOINTMENT (OUTPATIENT)
Dept: OBSTETRICS AND GYNECOLOGY | Facility: CLINIC | Age: 34
End: 2024-10-02
Payer: COMMERCIAL

## 2024-10-08 ENCOUNTER — HOSPITAL ENCOUNTER (OUTPATIENT)
Dept: RADIOLOGY | Facility: CLINIC | Age: 34
Discharge: HOME | End: 2024-10-08
Payer: COMMERCIAL

## 2024-10-08 ENCOUNTER — OFFICE VISIT (OUTPATIENT)
Dept: ORTHOPEDIC SURGERY | Facility: CLINIC | Age: 34
End: 2024-10-08
Payer: COMMERCIAL

## 2024-10-08 DIAGNOSIS — M79.672 LEFT FOOT PAIN: ICD-10-CM

## 2024-10-08 DIAGNOSIS — M25.572 LEFT ANKLE PAIN, UNSPECIFIED CHRONICITY: ICD-10-CM

## 2024-10-08 DIAGNOSIS — S92.025D CLOSED NONDISPLACED FRACTURE OF ANTERIOR PROCESS OF LEFT CALCANEUS WITH ROUTINE HEALING: Primary | ICD-10-CM

## 2024-10-08 DIAGNOSIS — M79.2 NEURITIS: ICD-10-CM

## 2024-10-08 PROCEDURE — 73630 X-RAY EXAM OF FOOT: CPT | Mod: LEFT SIDE | Performed by: RADIOLOGY

## 2024-10-08 PROCEDURE — 73630 X-RAY EXAM OF FOOT: CPT | Mod: LT

## 2024-10-08 PROCEDURE — 73610 X-RAY EXAM OF ANKLE: CPT | Mod: LT

## 2024-10-08 PROCEDURE — 99213 OFFICE O/P EST LOW 20 MIN: CPT | Performed by: STUDENT IN AN ORGANIZED HEALTH CARE EDUCATION/TRAINING PROGRAM

## 2024-10-08 PROCEDURE — 73610 X-RAY EXAM OF ANKLE: CPT | Mod: LEFT SIDE | Performed by: RADIOLOGY

## 2024-10-08 RX ORDER — CAPSAICIN 0.03 G/100G
CREAM TOPICAL 2 TIMES DAILY
Qty: 56.6 G | Refills: 0 | Status: SHIPPED | OUTPATIENT
Start: 2024-10-08 | End: 2025-10-08

## 2024-10-08 ASSESSMENT — PAIN DESCRIPTION - DESCRIPTORS: DESCRIPTORS: ACHING;SORE

## 2024-10-08 ASSESSMENT — PAIN - FUNCTIONAL ASSESSMENT: PAIN_FUNCTIONAL_ASSESSMENT: 0-10

## 2024-10-08 ASSESSMENT — PAIN SCALES - GENERAL: PAINLEVEL_OUTOF10: 3

## 2024-10-08 NOTE — PROGRESS NOTES
ORTHOPAEDIC SURGERY OUTPATIENT PROGRESS NOTE    Chief Complaint:  Left foot pain    History Of Present Illness  Nathan Jolly is a 33 y.o. female who presents for evaluation of left foot pain and LIC follow-up.  Patient sustained an inversion type injury to her foot and ankle from 09/12/2024.  She was seen, evaluated, x-rays were obtained and she was recommended to weight-bear as tolerated in a walking boot.  Overall her pain has been improving.  She has noticed decreased standing tolerance.  She reports no prior history of similar injury.  She denies any associated numbness tingling or weakness.  Patient has several children at home and is on her feet extensively throughout the day.     Past Medical History  Past Medical History:   Diagnosis Date    Cellulitis of left toe 02/20/2020    Elevated glucose tolerance test     Irregular menstrual cycle     Plantar fasciitis     Postpartum depression     Retained placenta (HHS-HCC)     Retained placenta without hemorrhage (HHS-HCC)     Retained placenta       Surgical History  Recent Surgeries in Orthopaedic Surgery            No cases to display             Social History  Social History     Socioeconomic History    Marital status:    Occupational History    Occupation: RN     Employer: [employer]     Comment: Informatics   Tobacco Use    Smoking status: Never    Smokeless tobacco: Never   Vaping Use    Vaping status: Never Used   Substance and Sexual Activity    Alcohol use: Never    Drug use: Never    Sexual activity: Yes     Partners: Male       Family History  Family History   Problem Relation Name Age of Onset    Hypothyroidism Mother      Other (Non hodgkins lymphoma) Father MM     Depression Father MM     Diabetes Father MM     Heart failure Father MM     Hypothyroidism Sister      Parkinsonism Other Aunt         Allergies  No Known Allergies    Review of Systems  REVIEW OF SYSTEMS  Constitutional: no unplanned weight loss  Psychiatric: no  suicidal ideation  ENT: no vision changes, no sinus problems  Pulmonary: no shortness of breath  Lymphatic: no enlarged lymph nodes  Cardiovascular: no chest pain or shortness of breath  Gastrointestinal: no stomach problems  Genitourinary: no dysuria   Skin: no rashes  Endocrine: no thyroid problems  Neurological: no headache, no numbness  Hematological: no easy bruising  Musculoskeletal: Left foot pain     Physical Exam  PHYSICAL EXAMINATION  Constitutional Exam: well developed and well nourished  Psychiatric Exam: alert and oriented, appropriate mood and behavior  Eye Exam: EOMI  Pulmonary Exam: breathing non-labored, no apparent distress  Lymphatic exam: no appreciable lymphadenopathy in the lower extremities  Cardiovascular exam: RRR to peripheral palpation, DP pulses 2+, PT 2+, toes are pink with good capillary refill, no pitting edema  Skin exam: no open lesions, rashes, abrasions or ulcerations  Neurological exam: sensation to light touch intact in both lower extremities in peripheral and dermatomal distributions (except for any abnormalities noted in musculoskeletal exam)    Musculoskeletal exam: Left lower extremity examination.  Patient pain localized to the dorsal lateral foot.  She is tender to palpation overlying the anterior process of the calcaneus.  Nontender to palpation of the sinus Tarsi.  Minimal tenderness to palpation at the ATFL, nontender to palpation of the CFL, peroneal tendons and AITFL.  There is no obvious ankle effusion.  Patient has greater than physiologic hindfoot valgus with pes planus arch posture and no significant forefoot deformity noted.  Patient has pain-free and supple ankle, subtalar and somewhat painful midtarsal joint range of motion.  Patient has sensation intact light touch grossly in a saphenous, sural, superficial peroneal, deep peroneal and tibial nerve distribution.  She is intact but pain limited PF/DF/EHL.  She has 2+ DP/PT pulse palpated.  She has a negative  anterior drawer, negative talar tilt.  Negative syndesmotic squeeze test.  Positive Tinel's overlying the SPN.     Last Recorded Vitals  not currently breastfeeding.    Laboratory Results  No results found for this or any previous visit (from the past 24 hour(s)).     Radiology Results  X-ray imaging 3 view weightbearing left foot and ankle reviewed from 10/03/2024 and independently evaluated by me demonstrates no acute fracture or dislocation, evident remodeling about nondisplaced anterior process calcaneus fracture.  No obvious lateral talar process fracture.    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  I have reviewed the diagnosis and treatment options extensively with the patient.  I would recommend the patient continue weightbearing to her tolerance in her left lower extremity in a walking boot.  The patient may steadily transition out of the walking boot as she feels comfortable.  I will provide her with an HEP for ankle and foot mobility.  I will plan to see the patient back in approximately 3 weeks to follow her clinical course.  Upon return, patient would require three-view weightbearing left foot.    Sandip Garcia MD, KEV  Department of Orthopaedic Surgery  [facility]    The diagnosis and treatment plan were reviewed with the patient. All questions were answered. The patient verbalized understanding of the treatment plan. There were no barriers to understanding identified.    Note dictated with [employer] software.  Completed without full type editing and sent to avoid delay.

## 2024-10-09 ENCOUNTER — APPOINTMENT (OUTPATIENT)
Dept: OBSTETRICS AND GYNECOLOGY | Facility: CLINIC | Age: 34
End: 2024-10-09
Payer: COMMERCIAL

## 2024-10-15 ENCOUNTER — APPOINTMENT (OUTPATIENT)
Dept: BEHAVIORAL HEALTH | Facility: CLINIC | Age: 34
End: 2024-10-15
Payer: COMMERCIAL

## 2024-10-15 DIAGNOSIS — F43.23 ADJUSTMENT DISORDER WITH MIXED ANXIETY AND DEPRESSED MOOD: Primary | ICD-10-CM

## 2024-10-15 PROCEDURE — 90791 PSYCH DIAGNOSTIC EVALUATION: CPT | Performed by: PSYCHOLOGIST

## 2024-10-15 NOTE — PROGRESS NOTES
Initial Assessment  Virtual telehealth visit using audio and visual equipment  POS 20  No falls, SI, Tobacco use    Nathan, [place] is referred by her OB NP for symptoms of postpartum depression and PMDD and chronic depression/anxiety.  Nathan is  to Gume Tanika.  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 He is a chronic marijuana uses (5x per day) to treat undiagnosed anxiety.  Very much like her father who was chronically depressed and her mother was primary breadwinner and ran the household  They are [employer]. She has 2 boys, 11 and 8 and a 6 year old girl. She had a tragic loss in May at 17 weeks due to rare disorder Pentology of [place].  Also she broke her foot a few weeks ago  She has vivid dreams on SSRIs (had PPD with all 3) and anxious on wellbutrin (1 dose but took too much by acident).  Also birth control doesn't help.  We agreed to use CBT as primary treatment.  She is to keep track of catastrophizing and bracing for the worst. She is also to reduce her expectations and perfectionism and focus on more carlyle and pleasure vs being the perfect mother.  Her 11 year old has anxiety and thus helping him can help her as well. She takes carlyle in her children and guarantees she is not suicidal.  She has her mother and a brother in [place].  Will see her every 2 weeks.

## 2024-10-30 ENCOUNTER — HOSPITAL ENCOUNTER (OUTPATIENT)
Dept: RADIOLOGY | Facility: CLINIC | Age: 34
Discharge: HOME | End: 2024-10-30
Payer: COMMERCIAL

## 2024-10-30 ENCOUNTER — OFFICE VISIT (OUTPATIENT)
Dept: ORTHOPEDIC SURGERY | Facility: CLINIC | Age: 34
End: 2024-10-30
Payer: COMMERCIAL

## 2024-10-30 DIAGNOSIS — S92.025D CLOSED NONDISPLACED FRACTURE OF ANTERIOR PROCESS OF LEFT CALCANEUS WITH ROUTINE HEALING: ICD-10-CM

## 2024-10-30 PROCEDURE — 99213 OFFICE O/P EST LOW 20 MIN: CPT | Performed by: STUDENT IN AN ORGANIZED HEALTH CARE EDUCATION/TRAINING PROGRAM

## 2024-10-30 PROCEDURE — 73630 X-RAY EXAM OF FOOT: CPT | Mod: LT

## 2024-10-30 ASSESSMENT — PAIN - FUNCTIONAL ASSESSMENT: PAIN_FUNCTIONAL_ASSESSMENT: 0-10

## 2024-10-30 ASSESSMENT — PAIN SCALES - GENERAL: PAINLEVEL_OUTOF10: 2

## 2024-10-30 ASSESSMENT — PAIN DESCRIPTION - DESCRIPTORS: DESCRIPTORS: ACHING;SHARP

## 2024-12-10 ENCOUNTER — APPOINTMENT (OUTPATIENT)
Dept: ORTHOPEDIC SURGERY | Facility: CLINIC | Age: 34
End: 2024-12-10
Payer: COMMERCIAL

## 2024-12-16 ENCOUNTER — APPOINTMENT (OUTPATIENT)
Dept: OBSTETRICS AND GYNECOLOGY | Facility: CLINIC | Age: 34
End: 2024-12-16
Payer: COMMERCIAL

## 2024-12-18 ENCOUNTER — APPOINTMENT (OUTPATIENT)
Dept: OBSTETRICS AND GYNECOLOGY | Facility: CLINIC | Age: 34
End: 2024-12-18
Payer: COMMERCIAL

## 2024-12-18 DIAGNOSIS — Z32.01 PREGNANCY TEST POSITIVE (HHS-HCC): Primary | ICD-10-CM

## 2024-12-18 PROCEDURE — 99212 OFFICE O/P EST SF 10 MIN: CPT

## 2024-12-20 DIAGNOSIS — Z32.01 PREGNANCY TEST POSITIVE (HHS-HCC): Primary | ICD-10-CM

## 2024-12-22 NOTE — PROGRESS NOTES
Subjective   Nathan Jolly is a 34 y.o. female who presents virtually for follow up for birth control pills and post pregnancy loss emotional check in. Patient reports that she was not a fan of the birth control pills, that she stopped taking them, and has recently found out that she is pregnant. She reports some difficult feelings in the s/o recent pregnancy loss. She reports no SI/HI. She reports that she wasn't sure if her appointment with Dr. Chambers helped but may consider more close psych management during this pregnancy.       Objective   There were no vitals taken for this visit.   General: NAD, mood appropriate  No further pe completed, visit completed virtually.     Assessment/Plan   Diagnoses and all orders for this visit:  Pregnancy test positive ([facility])    Discussed with patient getting set up with initial OB visit.   Order placed for all initial ob labs.   Encouraged to reach out to our office with any questions or concerns.       HANS Feliciano

## 2025-01-02 ENCOUNTER — LAB (OUTPATIENT)
Dept: LAB | Facility: LAB | Age: 35
End: 2025-01-02
Payer: COMMERCIAL

## 2025-01-02 DIAGNOSIS — Z32.01 PREGNANCY TEST POSITIVE (HHS-HCC): ICD-10-CM

## 2025-01-02 LAB
ABO GROUP (TYPE) IN BLOOD: NORMAL
ANTIBODY SCREEN: NORMAL
ERYTHROCYTE [DISTWIDTH] IN BLOOD BY AUTOMATED COUNT: 12 % (ref 11.5–14.5)
EST. AVERAGE GLUCOSE BLD GHB EST-MCNC: 97 MG/DL
HBA1C MFR BLD: 5 %
HBV SURFACE AG SERPL QL IA: NONREACTIVE
HCT VFR BLD AUTO: 39.2 % (ref 36–46)
HCV AB SER QL: NONREACTIVE
HGB BLD-MCNC: 13.3 G/DL (ref 12–16)
HIV 1+2 AB+HIV1 P24 AG SERPL QL IA: NONREACTIVE
MCH RBC QN AUTO: 28.3 PG (ref 26–34)
MCHC RBC AUTO-ENTMCNC: 33.9 G/DL (ref 32–36)
MCV RBC AUTO: 83 FL (ref 80–100)
NRBC BLD-RTO: 0 /100 WBCS (ref 0–0)
PLATELET # BLD AUTO: 268 X10*3/UL (ref 150–450)
RBC # BLD AUTO: 4.7 X10*6/UL (ref 4–5.2)
REFLEX ADDED, ANEMIA PANEL: NORMAL
RH FACTOR (ANTIGEN D): NORMAL
RUBV IGG SERPL IA-ACNC: 2.3 IA
RUBV IGG SERPL QL IA: POSITIVE
TREPONEMA PALLIDUM IGG+IGM AB [PRESENCE] IN SERUM OR PLASMA BY IMMUNOASSAY: NONREACTIVE
WBC # BLD AUTO: 7.2 X10*3/UL (ref 4.4–11.3)

## 2025-01-02 PROCEDURE — 86780 TREPONEMA PALLIDUM: CPT

## 2025-01-02 PROCEDURE — 85027 COMPLETE CBC AUTOMATED: CPT

## 2025-01-02 PROCEDURE — 83036 HEMOGLOBIN GLYCOSYLATED A1C: CPT

## 2025-01-02 PROCEDURE — 86900 BLOOD TYPING SEROLOGIC ABO: CPT

## 2025-01-02 PROCEDURE — 83021 HEMOGLOBIN CHROMOTOGRAPHY: CPT

## 2025-01-02 PROCEDURE — 87340 HEPATITIS B SURFACE AG IA: CPT

## 2025-01-02 PROCEDURE — 86901 BLOOD TYPING SEROLOGIC RH(D): CPT

## 2025-01-02 PROCEDURE — 83020 HEMOGLOBIN ELECTROPHORESIS: CPT

## 2025-01-02 PROCEDURE — 86317 IMMUNOASSAY INFECTIOUS AGENT: CPT

## 2025-01-02 PROCEDURE — 86850 RBC ANTIBODY SCREEN: CPT

## 2025-01-02 PROCEDURE — 86803 HEPATITIS C AB TEST: CPT

## 2025-01-02 PROCEDURE — 87389 HIV-1 AG W/HIV-1&-2 AB AG IA: CPT

## 2025-01-07 ENCOUNTER — APPOINTMENT (OUTPATIENT)
Dept: OBSTETRICS AND GYNECOLOGY | Facility: CLINIC | Age: 35
End: 2025-01-07
Payer: COMMERCIAL

## 2025-01-07 VITALS — BODY MASS INDEX: 38.76 KG/M2 | WEIGHT: 225.8 LBS | SYSTOLIC BLOOD PRESSURE: 144 MMHG | DIASTOLIC BLOOD PRESSURE: 85 MMHG

## 2025-01-07 DIAGNOSIS — Z34.90 PRENATAL CARE, ANTEPARTUM (HHS-HCC): ICD-10-CM

## 2025-01-07 DIAGNOSIS — Z32.01 PREGNANCY TEST POSITIVE (HHS-HCC): ICD-10-CM

## 2025-01-07 DIAGNOSIS — Z34.81 PRENATAL CARE, SUBSEQUENT PREGNANCY IN FIRST TRIMESTER (HHS-HCC): ICD-10-CM

## 2025-01-07 PROBLEM — Z3A.10 10 WEEKS GESTATION OF PREGNANCY (HHS-HCC): Status: ACTIVE | Noted: 2025-01-07

## 2025-01-07 PROBLEM — O09.299 HISTORY OF FETAL ANOMALY IN PRIOR PREGNANCY, CURRENTLY PREGNANT (HHS-HCC): Status: ACTIVE | Noted: 2025-01-07

## 2025-01-07 PROBLEM — Z87.59 HISTORY OF RETAINED PLACENTA: Status: ACTIVE | Noted: 2025-01-07

## 2025-01-07 LAB
POC APPEARANCE, URINE: CLEAR
POC BILIRUBIN, URINE: NEGATIVE
POC BLOOD, URINE: NEGATIVE
POC COLOR, URINE: YELLOW
POC GLUCOSE, URINE: NEGATIVE MG/DL
POC KETONES, URINE: ABNORMAL MG/DL
POC LEUKOCYTES, URINE: NEGATIVE
POC NITRITE,URINE: NEGATIVE
POC PH, URINE: 5.5 PH
POC PROTEIN, URINE: NEGATIVE MG/DL
POC SPECIFIC GRAVITY, URINE: >=1.03
POC UROBILINOGEN, URINE: 0.2 EU/DL
PREGNANCY TEST URINE, POC: POSITIVE

## 2025-01-07 PROCEDURE — 87086 URINE CULTURE/COLONY COUNT: CPT

## 2025-01-07 PROCEDURE — 87591 N.GONORRHOEAE DNA AMP PROB: CPT

## 2025-01-07 PROCEDURE — 87491 CHLMYD TRACH DNA AMP PROBE: CPT

## 2025-01-07 ASSESSMENT — ENCOUNTER SYMPTOMS
RESPIRATORY NEGATIVE: 0
EYES NEGATIVE: 0
CONSTITUTIONAL NEGATIVE: 0
ALLERGIC/IMMUNOLOGIC NEGATIVE: 0
CARDIOVASCULAR NEGATIVE: 0
MUSCULOSKELETAL NEGATIVE: 0
GASTROINTESTINAL NEGATIVE: 0
NEUROLOGICAL NEGATIVE: 0
HEMATOLOGIC/LYMPHATIC NEGATIVE: 0
ENDOCRINE NEGATIVE: 0
PSYCHIATRIC NEGATIVE: 0

## 2025-01-07 ASSESSMENT — EDINBURGH POSTNATAL DEPRESSION SCALE (EPDS)
I HAVE LOOKED FORWARD WITH ENJOYMENT TO THINGS: AS MUCH AS I EVER DID
I HAVE FELT SAD OR MISERABLE: NO, NOT AT ALL
THINGS HAVE BEEN GETTING ON TOP OF ME: NO, I HAVE BEEN COPING AS WELL AS EVER
I HAVE BEEN SO UNHAPPY THAT I HAVE BEEN CRYING: NO, NEVER
I HAVE BEEN SO UNHAPPY THAT I HAVE HAD DIFFICULTY SLEEPING: NOT AT ALL
I HAVE BEEN ANXIOUS OR WORRIED FOR NO GOOD REASON: NO, NOT AT ALL
I HAVE BEEN ABLE TO LAUGH AND SEE THE FUNNY SIDE OF THINGS: AS MUCH AS I ALWAYS COULD
I HAVE FELT SCARED OR PANICKY FOR NO GOOD REASON: NO, NOT AT ALL
TOTAL SCORE: 0
THE THOUGHT OF HARMING MYSELF HAS OCCURRED TO ME: NEVER
I HAVE BLAMED MYSELF UNNECESSARILY WHEN THINGS WENT WRONG: NO, NEVER

## 2025-01-07 NOTE — PROGRESS NOTES
Assessment   Problem List Items Addressed This Visit    None  Visit Diagnoses         Codes    Pregnancy test positive ([facility])     Z32.01    Relevant Orders    POCT pregnancy, urine manually resulted (Completed)    POCT UA Automated manually resulted (Completed)    Prenatal care, antepartum ([facility])     Z34.90    Prenatal care, subsequent pregnancy in first trimester ([facility])     Z34.81            Plan   - New OB resources provided and reviewed with particular attention to dietary, travel, and medication restrictions  - Oriented to practice, CNM vs. MD care  - Reviewed bleeding precautions, warning signs, when to call provider; phone number provided  - Routine NOB labs ordered  - Return in 4 weeks for routine prenatal care    Cheryl Wilson, ISABELLE-CNM    Subjective   Nathan Jolly is a 34 y.o.  at Unknown with a working estimated date of delivery of Not found. who presents for an initial prenatal visit. This pregnancy is planned.    Patient currently experiencing:  intermittent n/v, breast tenderness  Bleeding or cramping since LMP:  spotting after intercourse  Taking prenatal vitamin: Yes  Ultrasound completed this pregnancy: No    Last pap: up to date    OB History    Para Term  AB Living   5 3 3 0 0 3   SAB IAB Ectopic Multiple Live Births   0 0 0 0 3      # Outcome Date GA Lbr Bradford/2nd Weight Sex Type Anes PTL Lv   5 Current            4 Term 18 40w0d  3.232 kg F Vag-Spont EPI N LIN   3 Term 11/27/15 40w0d  3.232 kg M Vag-Spont EPI N LIN      Complications: Placenta, retained ([facility])   2 Term 13 39w0d  3.232 kg M Vag-Spont EPI N LIN      Complications: Placenta, retained ([facility])   1               Superior  Depression Scale Total: 0  Previous history of gestational diabetes? no  Previous history of third or fourth degree laceration? no  Previous history of hypertension? no  Previous history of  section or other uterine surgery? no  Plan  for genetic testing? yes with gender? no    Prior pregnancy complications: fetal anomaly    History of hypertension:  No    Past Medical History:   Diagnosis Date    Cellulitis of left toe 02/20/2020    Elevated glucose tolerance test     Irregular menstrual cycle     Plantar fasciitis     Postpartum depression     Retained placenta ([facility])     Retained placenta without hemorrhage ([facility])     Retained placenta      Past Surgical History:   Procedure Laterality Date    DILATION AND CURETTAGE OF UTERUS  12/21/2015    Dilation And Curettage      Social History     Socioeconomic History    Marital status:    Occupational History    Occupation: RN     Employer: [employer]     Comment: Informatics   Tobacco Use    Smoking status: Never    Smokeless tobacco: Never   Vaping Use    Vaping status: Never Used   Substance and Sexual Activity    Alcohol use: Never    Drug use: Never    Sexual activity: Yes     Partners: Male        Objective   Physical Exam  Weight: 102 kg (225 lb 12.8 oz)  TWG: 3.084 kg (6 lb 12.8 oz)   Expected Total Weight Gain: 5 kg (11 lb)-9 kg (19 lb)   Pregravid BMI: 37.57  BP: 144/85    ASA in pregnancy  Reviewed indication for use  Review of Systems     Physical Exam  Constitutional:       Appearance: Normal appearance.   HENT:      Head: Normocephalic.      Nose: Nose normal.      Mouth/Throat:      Mouth: Mucous membranes are moist.   Eyes:      Pupils: Pupils are equal, round, and reactive to light.   Cardiovascular:      Rate and Rhythm: Normal rate.      Pulses: Normal pulses.   Pulmonary:      Effort: Pulmonary effort is normal.   Abdominal:      General: Abdomen is flat.      Palpations: Abdomen is soft.   Musculoskeletal:         General: Normal range of motion.      Cervical back: Normal range of motion and neck supple.   Neurological:      General: No focal deficit present.      Mental Status: She is alert and oriented to person, place, and time.   Skin:     General:  Skin is warm.   Psychiatric:         Mood and Affect: Mood normal.         Behavior: Behavior normal. Behavior is cooperative.   Vitals reviewed.          Postpartum Depression: Low Risk  (2025)    [employer]  Depression Scale     Last EPDS Total Score: 0     Last EPDS Self Harm Result: Never        Pregnancy Problems (from 25 to present)       No problems associated with this episode.             Education provided:   Avoidance of alcohol, tobacco and drug use   Dietary restrictions reviewed including avoiding raw or poorly cooked meat, lunch meat and soft cheeses  3.    Adequate water intake.  Avoid empty calories with juices  4.    Recommendation for weight gain based on initial BMI (body mass index)  5.    Limit caffeine to less than 200-300 mg/day  6.    Take folic acid 400 mcg daily.  Incorporate 5,000u Vitamin D3 per day.  Discuss Magnesium Supplementation  7.    Importance of good sleep hygiene and avoidance of laying on back after 15 weeks  8.    Encourage daily physical activity of 30 minutes a day the majority of the days of the week  9.    Discussed normal physiologic changes:  Round ligament pain, nausea, breast tenderness  10.  Discussed natural remedies, vitamins and prescription medications for nausea  11.  Baby aspirin 162mg daily (two baby aspirin) for the reduction of pre-eclampsia during pregnancy.  Even if you have          never had any blood pressure issues, you can develop hypertension during your pregnancy.  This has been well          Studied and safe to take starting at 12 weeks gestation until after the birth of your baby.    **IF AT ANYTIME DURING YOUR PREGNANCY YOU HAVE CONCERNS THAT YOU CANNOT AFFORD HEALTHY FOOD PLEASE LET US KNOW!**  We have a Food for Life program and would be happy to place a referral for you.  It is so important to eat healthy during your pregnancy and we treat food as medicine.  Healthy food is expensive!  This program will allow you and your  family up to 4 to receive food and recipes for one week per month.  This needs to be renewed every 6 months.    Ultrasound and screening for aneuploidies (Down Syndrome/Trisomy 21, Trisomy 13 + 18)  A requisition has been placed for a dating ultrasound.  Please call to get that scheduled.    At this ultrasound they will ask you if your would like to proceed with screening for genetic disorders that are listed above.  They will do that with an ultrasound at approximately 13 weeks and we also draw blood work for screening which includes the fetal sex if you desire to know.    Ultrasound for anatomy will be done at 19 weeks.  Based on risk factors and any concerns the maternal fetal medicine provider has, you may need a repeat ultrasound.  Healthy pregnancies that do not have any other concerns by the midwife or maternal fetal medicine do not have any repeat ultrasounds done.    Labs:   An order will be placed for your new ob labs.  Please get those done at the time of your ultrasound.  They will collect          multiple tubes of blood for new ob labs and also urine for STI testing and a urine culture.   If there are any concerns with any blood work or urine testing WE WILL CALL YOU OR COMMUNICATE VIA          [employer]!!!   The biggest concerns our patients have is when they see their complete blood count.  The reference          range in the result is for a non pregnant person!  We will notify you if there is any need to start an iron supplement.  3.    At 26-28 weeks a glucose test is ordered to see if you have gestational diabetes.  We also reassess if you have          Anemia, which can be common in pregnancy  4.    Group B strep culture will be done at 36 weeks gestation.    We also recommend that you be screened once in your life for Cystic Fibrosis and Spinal Muscular Atrophy.  This assesses if we need your partner to be tested if you are a carrier of a gene that can be passed along to your baby.  For this to  "happen, both parents must be a carrier to the gene.    Choices for care and hospital for birth:  I am a Certified Nurse Midwife and practice in a group setting, which means that any of the midwives in my group practice may be there for your birth.  We care for healthy females during pregnancy and labor/birth.  We practice in collaboration with physicians within our group.  If there are any concerns with your pregnancy, labor or birth our physicians work closely with us.      The midwives in our practice strongly encourage you to explore the option of Centering Pregnancy which has been studied for better birth outcomes!  Care will be done in a group setting with 1:1 time with a midwife and then in depth education about every stage of your pregnancy, labor/birth,  care, feeding choices, pediatrician options, birth control and coping techniques for the first few weeks after birth.  We have day groups and our [place] location and a Monday evening group at [facility].  The groups follow your normal prenatal schedule and yes, we keep in contact and I see you at the end of your pregnancy.    There are certain medical conditions that \"risks you out\" of midwifery care that we are constantly assessing for.  Some conditions during your pregnancy that would risk you out of midwifery care would be:   Severe growth restriction of your baby   Labor/Birth  less than 35 weeks   Severe pre-eclampsia at any time during your pregnancy/labor/birth   Gestational Diabetes needing medication (insulin) to control your blood sugars   If you decline or do not complete your glucose testing to rule out diet controlled diabetes by 32 weeks   If you are diagnosed with chronic hypertension during your pregnancy and need to start medication    The options for birth where we provide  Certified Nurse Midwifery coverage with a board certified OB/GYN, in house anesthesia and neonataology coverage are:    [facility] " "[facility] at [place], [place]    To call for questions regarding your care of if you are in labor is [phone]  My nurse can answer questions and keep me updated should any questions or concerns arise during your pregnancy.    After hours, the answering service will ask you where you intended to give birth and connect you to the midwife on call at [facility] or the [facility] at [facility].    If you would like to tour either facility:  Please call the Childbirth education line at [phone]    Danger signs to report:  Seek medical care immediately if you have pain that is doubling you over or vaginal bleeding that is heavier than a  period  Notify the office should you have any burning, urgency, frequency of urination or other concerning symptoms.    Medications that are safe for common discomforts of pregnancy:   Tylenol   Tums or Papaya extract for any upset stomach or heartburn   Zyrtec, Claritin, Benadryl for allergy symptoms   Sudafed or Robitussin for cold symptoms  [facility] is an [facility] that is great for what medications are safe to take throughout your pregnancy and breastfeeding journey through the first year of life!  Well worth the $3.99    Work restrictions:  A normal healthy pregnancy without any complications are able to have the standard pregnancy work restrictions which is no pushing/pulling/lifting greater than 25 pounds independently    FMLA paperwork  Can be brought to the office for us to fill out for when you are starting your maternity leave (either your scheduled date of going to the hospital or your due date).  We cannot give out early FMLA when there is no documented medical conditions that are considered \"normal pregnancy\" events.    Comfort measures   Chiropractors are great for alleviation of ligament pain   Yoga is good for your ligaments and mentally preparing for baby and labor.  A prenatal yoga " class is recommended.  3.     Prenatal massages are fine  4.     A maternity support belt is an amazing thing that can help ligament pain -- can be purchased on Amazon  5.     Good supportive shoes are key to helping with ligament pain    Dental care  It is very important to see a dentist during your pregnancy for routine cleaning and also if you develop any dental pain during your pregnancy.  Healthy gums and teeth are very important during your pregnancy.  We can provide you with a dental letter if your dentist would like one.    Thank you for choosing our practice and [facility] for you healthcare!  I am excited to partner with you during this very special time!     If there is anything I can do to help make your experience is positive, please come to your visits with questions and concerns and do not be afraid to ask what is on your mind!  We will see you in the office every 4 weeks until you are 30 weeks, then every 2 weeks until 36 weeks and then weekly until your baby is born.    Until then, be well!  Cheryl Wilson, HANS

## 2025-01-07 NOTE — PATIENT INSTRUCTIONS
TAKING GOOD CARE OF YOURSELF WHILE YOU ARE PREGNANT    What Should I Eat?  You do not have to eat a lot more food during pregnancy. But it is important to eat the right food--the most  healthy food for you and your baby. Every day, make sure you have:  6 to 8 large glasses of water.  6 to 9 servings of whole grain foods like bread or pasta. By reading the label, you will know that you are getting ‘‘whole’’ grain and not just brown-colored bread or pasta (1 slice of bread or a half cup of cooked pasta is a serving).  3 to 4 servings of fruit. Fresh, raw fruit is best (1 small apple or a half cup of chopped fruit is a serving).  4 to 5 servings of vegetables (1 medium carrot or a half cup of chopped vegetables is a serving).  2 to 3 servings of lean meat, fish, eggs, or nuts. (A piece ofmeat the size of a pack of playing cards is 1 serving.)  1 serving of vitamin C-rich food, like oranges, sweet peppers, or tomatoes (one half cup is a serving).  2 to 3 servings of iron-rich foods, like black-eyed peas, sweet potatoes, greens, dried fruit, or meat.  1 serving of a food rich in folic acid, like dark green, leafy vegetables (one half cup is a serving).    Are Some Foods Dangerous?  Most women can eat any food they want while they are pregnant. But there are some foods that can be  dangerous to the health of your baby.    Fish -- Fish is good food. And it is an important food for growing a smart baby. But some fish have lots of dangerous chemicals. To avoid these chemicals:  Do not eat swordfish, shark, mike mackerel, or tilefish.  Eat salmon no more than 1 time per week.  Eat only ‘‘light’’ tuna. Do not eat albacore tuna.    Milk and cheese -- Dairy products are an important source of calcium, and calcium helps build strong bones and teeth. But some dairy products carry dangerous germs. To keep yourself and your baby safe, eat and drink only dairy products--such as milk, yogurt, and cheese--that are  pasteurized.    Prepared foods -- Any food that is spoiled or not cooked well can make you sick.  Do not eat any meat or fish that has not been cooked all the way through.  Do not eat any cooked food that has not been kept hot or chilled.  Wash knives, cutting boards, and your hands between handling raw meat and any other food--like fruits and vegetables--that you plan to eat raw.  Wash all fruits and vegetables with 1 tablespoon of vinegar in a pan of water to kill germs before you eat them.    Alcohol -- We know that alcohol is dangerous for your baby if you drink a lot during your pregnancy. It is safest to avoid all alcohol.    Coffee -- The most recent studies say that 2 cups of caffeinated drink each day is safe during pregnancy. This means 2 small cups of coffee or tea or 1 can of caffeinated soda.    Weight Gain  On average, the total amount of weight gain during pregnancy will fall in the following ranges:    Weight Type Average Pounds   Normal weight (BMI 18.5 - 24.9) 25 - 35 pounds   Underweight (BMI less than 18.5) 28 - 40 pounds   Overweight (BMI 25 - 29.9) 15 - 25 pounds   Obese (BMI greater or equal to 30) 11 - 20 pounds       Do I Need to Take Vitamins?  Even if your diet is good, a daily multivitamin is a good idea. All prenatal vitamins are pretty much the same,  so buy the cheapest kind. If you find that your vitamins upset your stomach, take a children’s chewable or gummy  vitamin. Be sure you get at least 400 micrograms of folic acid every day in the vitamin you chose. The number  of micrograms of folic acid is on the label of the bottle.    Is Exercise Important?  Yes! You are getting ready for an athletic event: labor! Daily exercise will help you stay fit, control your  weight, and be prepared for labor. Every day, try to get at least 30 minutes of moderate exercise like walking  or swimming. Do deep squats several times a day. This exercise will help control low back pain and help  prepare  your pelvis for delivery.    Are Some Exercises Dangerous?  You can continue to do pretty much any exercise you have been doing. It is important to avoid any danger of  blows to your stomach. You should avoid scuba diving and contact sports.    What if I Get Sick--Can I Take Medicine?  It is important to limit the medicines you take as much as possible. It is safe to take acetaminophen  (Tylenol). Avoid NSAIDs like ibuprofen (Motrin) and naproxen (Aleve).    Head cold -- Drink lots of fluids, gargle with warm salt water, take warm baths or showers, take Tylenol for headache and sore throat, suck on throat lozenges    Headaches -- Drink at least 8 big glasses of water every day, eat something healthy every 2 to 3 hours during the day, and take Tylenol    Constipation -- Drink lots of water, eat lots of fruits and vegetables, including dried fruits like prunes, and use a fiber supplement like Metamucil    Are There Danger Signs That I Need to Watch Out For?  Call your health care provider if:  You start to bleed like a period  You are leaking fluid  Your baby is not moving (after 24 weeks into your pregnancy)  You are having very bad headaches or your vision is blurry or you see ‘‘spots’’  You are having very bad pain  You are feeling very frightened or sad  You are very worried about something  NIPT testing:  NIPT Summary of Recommendations  Prenatal genetic screening (serum screening with or without nuchal translucency [NT] ultrasound or cell-free DNA screening) and diagnostic testing (chorionic villus sampling [CVS] or amniocentesis) options should be discussed and offered to all pregnant patients regardless of maternal age or risk of chromosomal abnormality. After review and discussion, every patient has the right to pursue or decline prenatal genetic screening and diagnostic testing.  If screening is accepted, patients should have one prenatal screening approach, and should not have multiple screening tests  performed simultaneously.  Cell-free DNA is the most sensitive and specific screening test for the common fetal aneuploidies. Nevertheless, it has the potential for false-positive and false-negative results. Furthermore, cell-free DNA testing is not equivalent to diagnostic testing.  All patients should be offered a second-trimester ultrasound for fetal structural defects, since these may occur with or without fetal aneuploidy; ideally this procedure is performed between 18 and 22 weeks of gestation (with or without second?trimester maternal serum alpha?fetoprotein).  Patients with a positive screening test result for fetal aneuploidy should undergo genetic counseling and a comprehensive ultrasound evaluation with an opportunity for diagnostic testing to confirm results.  Patients with a negative screening test result should be made aware that this substantially decreases their risk of the targeted aneuploidy but does not ensure that the fetus is unaffected. The potential for a fetus to be affected by genetic disorders that are not evaluated by the screening or diagnostic test should also be reviewed. Even if patients have a negative screening test result, they may choose diagnostic testing later in pregnancy, particularly if additional findings such as fetal anomalies identified on ultrasound examination become evident.  Patients whose cell-free DNA screening test results are not reported by the laboratory or are uninterpretable (a no?call test result) should be informed that test failure is associated with an increased risk of aneuploidy, receive further genetic counseling and be offered comprehensive ultrasound evaluation and diagnostic testing.  If an enlarged nuchal translucency or an anomaly is identified on ultrasound examination, the patient should be offered genetic counseling and diagnostic testing for genetic conditions and a comprehensive ultrasound evaluation including detailed ultrasonography at 18-22  weeks of gestation to assess for structural abnormalities.  The use of cell-free DNA screening as follow-up for patients with a screen positive serum analyte screening test result is an option for patients who want to avoid a diagnostic test. However, patients should be informed that this approach may delay definitive diagnosis and will fail to identify some fetuses with chromosomal abnormalities.  In clinical situations of an isolated soft ultrasonographic marker (such as echogenic cardiac focus, choroid plexus cyst, pyelectasis, short humerus or femur length) where aneuploidy screening has not been performed, the patient should be counseled regarding the risk of aneuploidy associated with the finding and cell-free DNA, quad screen testing, or amniocentesis should be offered. If aneuploidy testing is performed and is low risk, then no further risk assessment is needed. If more than one marker is identified, then genetic counseling, maternal-fetal medicine consultation, or both are recommended.  No method of aneuploidy screening that includes a serum sample is as accurate in twin gestations as it is in dhillon pregnancies; this information should be incorporated into pretest counseling for patients with multiple gestations.  Cell-free DNA screening can be performed in twin pregnancies. Overall, performance of screening for trisomy 21 by cell-free DNA in twin pregnancies is encouraging, but the total number of reported affected cases is small. Given the small number of affected cases it is difficult to determine an accurate detection rate for trisomy 18 and 13.  Because preimplantation genetic testing is not uniformly accurate, prenatal screening and prenatal diagnosis should be offered to all patients regardless of previous preimplantation genetic testing.  The use of multiple serum screening approaches performed independently (eg, a first-trimester screening test followed by a quad screen as an unlinked test) is  not recommended because it will result in an unacceptably high positive screening rate and could deliver contradictory risk estimates.  In multifetal gestations, if a fetal demise, vanishing twin, or anomaly is identified in one fetus, there is a significant risk of an inaccurate test result if serum-based aneuploidy screening or cell-free DNA is used. This information should be reviewed with the patient and diagnostic testing should be offered.  Patients with unusual or multiple aneuploidies detected by cell-free DNA should be referred for genetic counseling and maternal-fetal medicine consultation.  Our contact information is below in case you or your family need to call:  Your health care provider’s name: HANS Santamaria  Your health care provider’s phone number: [phone]

## 2025-01-08 ENCOUNTER — LAB (OUTPATIENT)
Dept: LAB | Facility: LAB | Age: 35
End: 2025-01-08
Payer: COMMERCIAL

## 2025-01-08 DIAGNOSIS — Z34.81 PRENATAL CARE, SUBSEQUENT PREGNANCY IN FIRST TRIMESTER (HHS-HCC): ICD-10-CM

## 2025-01-08 DIAGNOSIS — Z34.90 PRENATAL CARE, ANTEPARTUM (HHS-HCC): ICD-10-CM

## 2025-01-08 LAB
ABO GROUP (TYPE) IN BLOOD: NORMAL
ANTIBODY SCREEN: NORMAL
BACTERIA UR CULT: NORMAL
ERYTHROCYTE [DISTWIDTH] IN BLOOD BY AUTOMATED COUNT: 12.2 % (ref 11.5–14.5)
HBV SURFACE AG SERPL QL IA: NONREACTIVE
HCT VFR BLD AUTO: 41 % (ref 36–46)
HCV AB SER QL: NONREACTIVE
HGB BLD-MCNC: 13.7 G/DL (ref 12–16)
HIV 1+2 AB+HIV1 P24 AG SERPL QL IA: NONREACTIVE
MCH RBC QN AUTO: 28.2 PG (ref 26–34)
MCHC RBC AUTO-ENTMCNC: 33.4 G/DL (ref 32–36)
MCV RBC AUTO: 84 FL (ref 80–100)
NRBC BLD-RTO: 0 /100 WBCS (ref 0–0)
PLATELET # BLD AUTO: 272 X10*3/UL (ref 150–450)
RBC # BLD AUTO: 4.86 X10*6/UL (ref 4–5.2)
REFLEX ADDED, ANEMIA PANEL: NORMAL
RH FACTOR (ANTIGEN D): NORMAL
RUBV IGG SERPL IA-ACNC: 2.3 IA
RUBV IGG SERPL QL IA: POSITIVE
TREPONEMA PALLIDUM IGG+IGM AB [PRESENCE] IN SERUM OR PLASMA BY IMMUNOASSAY: NONREACTIVE
WBC # BLD AUTO: 7.3 X10*3/UL (ref 4.4–11.3)

## 2025-01-08 PROCEDURE — 86317 IMMUNOASSAY INFECTIOUS AGENT: CPT

## 2025-01-08 PROCEDURE — 86780 TREPONEMA PALLIDUM: CPT

## 2025-01-08 PROCEDURE — 86850 RBC ANTIBODY SCREEN: CPT

## 2025-01-08 PROCEDURE — 86901 BLOOD TYPING SEROLOGIC RH(D): CPT

## 2025-01-08 PROCEDURE — 86803 HEPATITIS C AB TEST: CPT

## 2025-01-08 PROCEDURE — 87389 HIV-1 AG W/HIV-1&-2 AB AG IA: CPT

## 2025-01-08 PROCEDURE — 85027 COMPLETE CBC AUTOMATED: CPT

## 2025-01-08 PROCEDURE — 87340 HEPATITIS B SURFACE AG IA: CPT

## 2025-01-08 PROCEDURE — 86900 BLOOD TYPING SEROLOGIC ABO: CPT

## 2025-01-09 LAB
C TRACH RRNA SPEC QL NAA+PROBE: NEGATIVE
N GONORRHOEA DNA SPEC QL PROBE+SIG AMP: NEGATIVE

## 2025-01-13 ENCOUNTER — HOSPITAL ENCOUNTER (OUTPATIENT)
Dept: RADIOLOGY | Facility: CLINIC | Age: 35
Discharge: HOME | End: 2025-01-13
Payer: COMMERCIAL

## 2025-01-13 DIAGNOSIS — Z34.90 PRENATAL CARE, ANTEPARTUM (HHS-HCC): ICD-10-CM

## 2025-01-13 PROCEDURE — 76801 OB US < 14 WKS SINGLE FETUS: CPT | Performed by: STUDENT IN AN ORGANIZED HEALTH CARE EDUCATION/TRAINING PROGRAM

## 2025-01-13 PROCEDURE — 76801 OB US < 14 WKS SINGLE FETUS: CPT

## 2025-01-21 ENCOUNTER — INITIAL PRENATAL (OUTPATIENT)
Dept: MATERNAL FETAL MEDICINE | Facility: CLINIC | Age: 35
End: 2025-01-21
Payer: COMMERCIAL

## 2025-01-21 VITALS — WEIGHT: 223 LBS | SYSTOLIC BLOOD PRESSURE: 130 MMHG | BODY MASS INDEX: 38.28 KG/M2 | DIASTOLIC BLOOD PRESSURE: 77 MMHG

## 2025-01-21 DIAGNOSIS — Z34.90 PRENATAL CARE, ANTEPARTUM (HHS-HCC): ICD-10-CM

## 2025-01-21 DIAGNOSIS — O99.210 OBESITY IN PREGNANCY (HHS-HCC): ICD-10-CM

## 2025-01-21 DIAGNOSIS — O09.299 HISTORY OF FETAL ANOMALY IN PRIOR PREGNANCY, CURRENTLY PREGNANT (HHS-HCC): Primary | ICD-10-CM

## 2025-01-21 DIAGNOSIS — O09.521 MULTIGRAVIDA OF ADVANCED MATERNAL AGE IN FIRST TRIMESTER (HHS-HCC): ICD-10-CM

## 2025-01-21 DIAGNOSIS — Z87.59 HISTORY OF RETAINED PLACENTA: ICD-10-CM

## 2025-01-21 DIAGNOSIS — Z3A.12 12 WEEKS GESTATION OF PREGNANCY (HHS-HCC): ICD-10-CM

## 2025-01-21 PROCEDURE — 99214 OFFICE O/P EST MOD 30 MIN: CPT | Performed by: STUDENT IN AN ORGANIZED HEALTH CARE EDUCATION/TRAINING PROGRAM

## 2025-01-21 ASSESSMENT — ENCOUNTER SYMPTOMS
PSYCHIATRIC NEGATIVE: 0
CONSTITUTIONAL NEGATIVE: 0
HEMATOLOGIC/LYMPHATIC NEGATIVE: 0
CARDIOVASCULAR NEGATIVE: 0
EYES NEGATIVE: 0
GASTROINTESTINAL NEGATIVE: 0
RESPIRATORY NEGATIVE: 0
ENDOCRINE NEGATIVE: 0
ALLERGIC/IMMUNOLOGIC NEGATIVE: 0
NEUROLOGICAL NEGATIVE: 0
MUSCULOSKELETAL NEGATIVE: 0

## 2025-01-21 NOTE — LETTER
2025     HANS Santamaria  [address]   [facility]  [facility] [address]    Patient: Nathan Jolly   YOB: 1990   Date of Visit: 2025       Dear HANS Aguillon:    Thank you for referring Nathan Jolly to me for evaluation. Below are my notes for this consultation.  If you have questions, please do not hesitate to call me. I look forward to following your patient along with you.       Sincerely,     Rica Brantley MD      CC: No Recipients  ______________________________________________________________________________________    25  Nathan Jolly     [facility] CONSULT NOTE  Referring Clinician: HANS Brown  Reason for consult: history of fetal anomaly    HPI: Nathan Jolly is a 34 y.o.  at 12w2d here for consult for prenatal consultation.    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 There were normal whole genome sequencing results. She has met with genetic counseling and was quoted a <1% recurrence risk for this process. This pregnancy is complicated by advanced maternal age. A normal first trimester anatomic survey has been completed, and cell free DNA screening for common aneuploidy is in process.      She also reports a history of retained placenta x2. She has had all vaginal deliveries. She is not taking aspirin and is not sure if she'd like to start it.    Patient Active Problem List   Diagnosis   • Chronic sinusitis   • Current moderate episode of major depressive disorder (Multi)   • Generalized anxiety disorder   • Obesity in pregnancy ([facility])   • Multigravida of advanced maternal age in first trimester ([facility])   • Fetal omphalocele during pregnancy, antepartum, fetus 1 ([facility])   • Fetal abnormality affecting management of mother ([facility])   • Encounter for elective  termination of pregnancy   • 10 weeks gestation of pregnancy ([facility])   • BMI 38.0-38.9,adult   • History of retained placenta   • History of fetal anomaly in prior pregnancy, currently pregnant ([facility])       OB History          5    Para   3    Term   3       0    AB   1    Living   3         SAB   0    IAB   1    Ectopic   0    Multiple   0    Live Births   3                 Past medical history: Denies HTN, DM, asthma, and thyroid issues    Past Surgical History:   Procedure Laterality Date   • DILATION AND CURETTAGE OF UTERUS  2015    Dilation And Curettage   • DILATION AND EVACUATION         Medications: prenatal vitamin    No Known Allergies    Social History     Tobacco Use   • Smoking status: Never   • Smokeless tobacco: Never   Vaping Use   • Vaping status: Never Used   Substance Use Topics   • Alcohol use: Never   • Drug use: Never       family history includes Depression in her father; Diabetes in her father; Heart failure in her father; Hypothyroidism in her mother and sister; Non hodgkins lymphoma in her father; Parkinsonism in an other family member.      OBJECTIVE  Visit Vitals  /77   Wt 101 kg (223 lb)   LMP 10/27/2024   BMI 38.28 kg/m²   OB Status Pregnant   Smoking Status Never   BSA 2.14 m²       Physical exam  Well appearing  Unlabored respirations  Mood/affect appropriate     ASSESSMENT & PLAN    Nathan Jolly is a 34 y.o.  at 12w2d here for the following:    Assessment & Plan  History of fetal anomaly in prior pregnancy, currently pregnant ([facility])  -Reviewed the history of Pentalogy of Sayra in a prior pregnancy resulting in a 17 week D&E. There was normal whole genome sequencing. As confirmed by prior genetic counseling sessions, there is an extremely low risk for recurrence with this sporadic condition.   -The first trimester anatomic survey was within normal limits, though we discussed the limitations to this early ultrasound. An early anatomic  survey is planned for the 16th week, and a detailed anatomic survey will follow at 19+ weeks.     Multigravida of advanced maternal age in first trimester ([facility])  -Cell free DNA screening results are pending  -I counseled the patient on starting aspirin for preeclampsia prophylaxis, and she will consider it further. This is most effective when started before 16 weeks gestation.     History of retained placenta  -Reviewed the increased risk of recurrence and the risk of adherent placenta with prior uterine instrumentation  -Optimization of pre-delivery hemoglobin is recommended given this increased risk    Obesity in pregnancy ([facility])  -Plan for growth ultrasounds at 30 and 36 weeks with once-weekly  testing to start at 37 weeks with delivery in the 39th week     12 weeks gestation of pregnancy ([facility])  -Continue care with primary OB     Thank you for allowing us to participate in the care of your patient. We anticipate she should be able to continue her care under your supervision. Please do not hesitate to contact us should any concerns arise.     Rica Brantley MD   Maternal Fetal Medicine

## 2025-01-21 NOTE — PROGRESS NOTES
25  Nathan Jolly     [facility] CONSULT NOTE  Referring Clinician: Cheryl Wilson, ISABELLE-JOSE AM  Reason for consult: history of fetal anomaly    HPI: Nathan Jolly is a 34 y.o.  at 12w2d here for consult for prenatal consultation.    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 There were normal whole genome sequencing results. She has met with genetic counseling and was quoted a <1% recurrence risk for this process. This pregnancy is complicated by advanced maternal age. A normal first trimester anatomic survey has been completed, and cell free DNA screening for common aneuploidy is in process.      She also reports a history of retained placenta x2. She has had all vaginal deliveries. She is not taking aspirin and is not sure if she'd like to start it.    Patient Active Problem List   Diagnosis    Chronic sinusitis    Current moderate episode of major depressive disorder (Multi)    Generalized anxiety disorder    Obesity in pregnancy ([facility])    Multigravida of advanced maternal age in first trimester ([facility])    Fetal omphalocele during pregnancy, antepartum, fetus 1 ([facility])    Fetal abnormality affecting management of mother ([facility])    Encounter for elective termination of pregnancy    10 weeks gestation of pregnancy ([facility])    BMI 38.0-38.9,adult    History of retained placenta    History of fetal anomaly in prior pregnancy, currently pregnant ([facility])       OB History          5    Para   3    Term   3       0    AB   1    Living   3         SAB   0    IAB   1    Ectopic   0    Multiple   0    Live Births   3                 Past medical history: Denies HTN, DM, asthma, and thyroid issues    Past Surgical History:   Procedure Laterality Date    DILATION AND CURETTAGE OF UTERUS  2015    Dilation And Curettage    DILATION AND EVACUATION         Medications:  prenatal vitamin    No Known Allergies    Social History     Tobacco Use    Smoking status: Never    Smokeless tobacco: Never   Vaping Use    Vaping status: Never Used   Substance Use Topics    Alcohol use: Never    Drug use: Never       family history includes Depression in her father; Diabetes in her father; Heart failure in her father; Hypothyroidism in her mother and sister; Non hodgkins lymphoma in her father; Parkinsonism in an other family member.      OBJECTIVE  Visit Vitals  /77   Wt 101 kg (223 lb)   LMP 10/27/2024   BMI 38.28 kg/m²   OB Status Pregnant   Smoking Status Never   BSA 2.14 m²       Physical exam  Well appearing  Unlabored respirations  Mood/affect appropriate     ASSESSMENT & PLAN    Nathan Jolly is a 34 y.o.  at 12w2d here for the following:    Assessment & Plan  History of fetal anomaly in prior pregnancy, currently pregnant ([facility])  -Reviewed the history of Pentalogy of Sayra in a prior pregnancy resulting in a 17 week D&E. There was normal whole genome sequencing. As confirmed by prior genetic counseling sessions, there is an extremely low risk for recurrence with this sporadic condition.   -The first trimester anatomic survey was within normal limits, though we discussed the limitations to this early ultrasound. An early anatomic survey is planned for the 16th week, and a detailed anatomic survey will follow at 19+ weeks.     Multigravida of advanced maternal age in first trimester ([facility])  -Cell free DNA screening results are pending  -I counseled the patient on starting aspirin for preeclampsia prophylaxis, and she will consider it further. This is most effective when started before 16 weeks gestation.     History of retained placenta  -Reviewed the increased risk of recurrence and the risk of adherent placenta with prior uterine instrumentation  -Optimization of pre-delivery hemoglobin is recommended given this increased risk    Obesity in pregnancy  ([facility])  -Plan for growth ultrasounds at 30 and 36 weeks with once-weekly  testing to start at 37 weeks with delivery in the 39th week     12 weeks gestation of pregnancy ([facility])  -Continue care with primary OB     Thank you for allowing us to participate in the care of your patient. We anticipate she should be able to continue her care under your supervision. Please do not hesitate to contact us should any concerns arise.     Rica Brantley MD   Maternal Fetal Medicine

## 2025-01-23 PROBLEM — Z3A.12 12 WEEKS GESTATION OF PREGNANCY (HHS-HCC): Status: ACTIVE | Noted: 2025-01-07

## 2025-01-23 PROBLEM — Z33.2 ENCOUNTER FOR ELECTIVE TERMINATION OF PREGNANCY: Status: RESOLVED | Noted: 2024-04-30 | Resolved: 2025-01-23

## 2025-01-23 PROBLEM — O35.9XX0 FETAL ABNORMALITY AFFECTING MANAGEMENT OF MOTHER (HHS-HCC): Status: RESOLVED | Noted: 2024-04-30 | Resolved: 2025-01-23

## 2025-01-23 LAB
COMMENTS - MP RESULT TYPE: NORMAL
SCAN RESULT: NORMAL

## 2025-01-23 NOTE — ASSESSMENT & PLAN NOTE
-Reviewed the increased risk of recurrence and the risk of adherent placenta with prior uterine instrumentation  -Optimization of pre-delivery hemoglobin is recommended given this increased risk

## 2025-01-23 NOTE — ASSESSMENT & PLAN NOTE
-Cell free DNA screening results are pending  -I counseled the patient on starting aspirin for preeclampsia prophylaxis, and she will consider it further. This is most effective when started before 16 weeks gestation.

## 2025-01-23 NOTE — ASSESSMENT & PLAN NOTE
-Plan for growth ultrasounds at 30 and 36 weeks with once-weekly  testing to start at 37 weeks with delivery in the 39th week

## 2025-01-23 NOTE — ASSESSMENT & PLAN NOTE
-Reviewed the history of Pentalogy of Sayra in a prior pregnancy resulting in a 17 week D&E. There was normal whole genome sequencing. As confirmed by prior genetic counseling sessions, there is an extremely low risk for recurrence with this sporadic condition.   -The first trimester anatomic survey was within normal limits, though we discussed the limitations to this early ultrasound. An early anatomic survey is planned for the 16th week, and a detailed anatomic survey will follow at 19+ weeks.

## 2025-02-04 ENCOUNTER — APPOINTMENT (OUTPATIENT)
Dept: OBSTETRICS AND GYNECOLOGY | Facility: CLINIC | Age: 35
End: 2025-02-04
Payer: COMMERCIAL

## 2025-02-11 ENCOUNTER — APPOINTMENT (OUTPATIENT)
Dept: OBSTETRICS AND GYNECOLOGY | Facility: CLINIC | Age: 35
End: 2025-02-11
Payer: COMMERCIAL

## 2025-02-11 VITALS — SYSTOLIC BLOOD PRESSURE: 124 MMHG | WEIGHT: 220 LBS | DIASTOLIC BLOOD PRESSURE: 74 MMHG | BODY MASS INDEX: 37.76 KG/M2

## 2025-02-11 DIAGNOSIS — Z3A.15 15 WEEKS GESTATION OF PREGNANCY (HHS-HCC): Primary | ICD-10-CM

## 2025-02-11 PROCEDURE — 0501F PRENATAL FLOW SHEET: CPT | Performed by: ADVANCED PRACTICE MIDWIFE

## 2025-02-11 RX ORDER — ASPIRIN 81 MG/1
81 TABLET ORAL DAILY
COMMUNITY

## 2025-02-11 RX ORDER — PNV 119/IRON FUM/FOLIC ACID 29 MG-1 MG
TABLET ORAL
COMMUNITY

## 2025-02-11 NOTE — PATIENT INSTRUCTIONS
DR. RAMIREZ'S PREGNANCY SUPPORT    [facility] CHILDBIRTH & PARENTING EDUCATION (Virtual & By Appointment Services)  http://www.[facility].org/gilberto/health-and-wellness/pregnancy-resources/childbirth-and-parenting-education-programs  [phone]  Recommended programs include the Prepared Childbirth series, the Infant Care series, Boot Camp for New Dads, Car Seat Safety assessment, Friends & Family CPR, and Prenatal Tours. If planning to labor without an epidural, recommend the Spinning Babies® and Hypnobirthing® courses.    [facility] HEALTH NETWORK  https://www.[facility].org/services/integrative-health-network/meet-the-team  [phone]  Network of acupuncturists, massage therapists, chiropractors, and integrative medicine specialists who assist with a variety of pregnancy-related concerns including but not limited to muscle or joint pain, breech presentation, and chronic pain conditions.    [facility] WOMEN'S MENTAL HEALTH CLINIC  https://www.[facility].org/services/psychiatry/conditions-treatments/womens-mental-health   [phone]  Specially trained team of mental health professionals who are experts in the treatment of anxiety, depression, bipolar illness, emotional trauma, and other mental conditions affecting pregnancy.    LOOKING FOR PROFESSIONAL BIRTH SUPPORT?  [employer] ( Certifying Organization)  https://www.tigist.org/  Type in your zip code to find a certified birth and postpartum  near you    [facility]  http://www.[email or web address].org/    CALM  SERVICES  https://calmlabevolso.[employer]/    NAEEM MA'AM  https://www.Womaiam.[employer]/    CLEBABY  https://www.clebaby.com    THE WOMB WELLNESS CENTER  http://www.[employer]/    NURTURED FOUNDATION   https://Rezeeturedftravelmob.[email or web address]/    [facility]  https://www.Devshopllagecle.org     FRUIT OF THE WOMB  SERVICES  https://  https://www.fruitofthewombirth.com     HELPFUL ONLINE RESOURCES   Evidence Based Birth ® website    Spinning Babies® website: “Flip a Breech,” “Engaging Baby in Labor,” “Three Levels of the Pelvis”    Caroline Swift (KIT-certified  and birth educator) videos on Youtube     Pregnancy and Postpartum TV videos (YouTube) - especially check out videos on diastasis recti, pelvic floor exercises, and pregnancy yoga for sciatica and low back pain     “How to Turn a Breech, Posterior or Transverse baby” - [employer] Mums [employer] (YouTube)    “How to do External Cephalic Version - Professional Version” - Merck Manuals (YouTube)    “False Labor vs.  True Labor” - [facility] (YouTube)    “The 8 Best Labor Positions for the First Phases of Childbirth” - [employer] Bump (Youtube)    “ Section” - [employer]INFO (YouTube)     [employer] breastfeeding videos (YouTube) - especially check out “Hand Expression” and “Breast Massage for Engorgement”     “How to Put on a Haaka Silicone Breastpump” - New "[employer] Life by Thao (Youtube)    DR. RAMIREZ'S GUIDE TO WRITING A BIRTH PLAN    A birth plan is a written statement of how a parent would like her labor and delivery to occur. Not every parent cares to make one, but a lot of parents do find the process of writing a birth plan useful.    I used to give patients a check-box style template to fill out, but I've found over time that such a document does not properly reflect the underlying goals of the birth plan, and can sometimes create unnecessary conflict between the parents and staff.    These days, I suggest you write a personalized birth plan. It should be relatively short (no more than 1-2 pages), polite, and help the care team understand you and your goals in a holistic way, highlighting any requests that are very important to you. Use positive rather than negative statements (e.g., “I would like to begin labor naturally” rather than “I do not want Pitocin.”).    Some  things to think about as you write your birth plan:  What would your ideal birth look like? How might you bring elements of that into your hospital birth? You don't have to necessarily put environmental “wants” into the birth plan document, but these are good things to discuss with your provider ahead of time, so you know what's allowed and what might be modified to meet your wishes.  What role will you play in achieving the birth you want? It is one thing to want to avoid a  section, and another to expect the medical team to control all the factors of your labor that might lead to a . How can the team support your work?  Some births will need medical intervention for the safety of the mom or baby. When such an intervention needs to occur, how would you like that interaction to look? What would make you feel safe and empowered when those choices must be made?  Do you have past experiences, good or bad, that influence your vision for this birth? If so, consider sharing some aspect of that with your care providers. Be specific about the details that will make a huge difference to you, especially if you might experience resentment or regret if those details are missed.  Who will speak for you if you're too tired, too sick, or too engrossed in labor? Let staff know ahead of time, so they don't assume someone is substituting their preferences for yours.  Your birth plan may be as simple as “Dear Birth Team: Please help me achieve the most natural birth possible. I so appreciate everyone helping us on this special day.”    Some things that parents commonly put in their birth plans are standard at [facility]. We allow a long labor before declaring it failed, we provide intermittent auscultation for medically qualified pregnancies, our episiotomy rate is <1%, we delay cord clamping and encourage skin-to-skin for healthy newborns. Your prenatal visits are a good time to learn more about the practice  patterns at this particular institution.    I encourage you to complete your birth plan at least 2-3 weeks before your expected delivery date, so we can discuss it before you arrive in the hospital.     We look forward to working with you in achieving a safe and memorable birth.    [facility] GUIDELINES FOR FETAL MONITORING    During your birth, it is important to ensure you and your baby are safe.   For this reason, some degree of fetal monitoring is always performed.     Some women may qualify for intermittent monitoring instead of continuous monitoring. This allows the woman to move around more during her labor.     To qualify for this, the woman must have a normal fetal monitoring result when she is first admitted to the hospital, AND be absent any high risk criteria (see below):   The use of labor-inducing medications (oxytocin, misoprostol, or Cervidil®)   Epidural analgesia   Fentanyl patient controlled analgesia   Meconium stained amniotic fluid   Previous  delivery   Diabetes, other than gestational diet-controlled   Hypertensive disorder   Intrauterine growth restriction   Multiple gestation   Low fluid   Gestation < 37.0 weeks   History of previous stillbirth   Unexplained vaginal bleeding   Maternal temp > 37.9 °C   Other known indication for continuous electronic fetal monitoring     If you are interested in intermittent monitoring, please let your provider know so we can discuss it in more detail.    [facility] GUIDELINES FOR NITROUS OXIDE  Nitrous oxide is a patient-administered pre-epidural pain relief option at [facility].  If you have any of the following conditions, unfortunately you will NOT be able to utilize nitrous oxide:  Inability to hold mask to face independent  Received IV narcotics within the last 2 hours  Impaired or intoxicated  Impaired oxygenation: cystic fibrosis or chronic lung disease, baseline O2 saturation <96%, history of sleep apnea  recommended for CPAP  Active COVID+   B12 deficiency  Conditions that include air in a closed body space: bowel obstruction, acute ear infection, acute pneumothorax, recent craniotomy, increased intracranial pressure, penetrating eye injury, retinal surgery  Hemodynamically unstable  Magnesium Sulfate administration  <35 weeks gestation  Epidural administration

## 2025-02-11 NOTE — PROGRESS NOTES
PLAN:  History of fetal anomaly in prior pregnancy, currently pregnant ([facility])  S/p MFM consult. Plan in chart.     ASSESSESMENT:  1. 15 weeks gestation of pregnancy ([facility])            She is here for 15w2d OB visit.  Denies cramping, leaking of fluid, or bleeding     NOB labs reviewed and WNL    PHYSICAL EXAM:   /74   Wt 99.8 kg (220 lb)   LMP 10/27/2024   BMI 37.76 kg/m²   I have reviewed her pertinent history, lab results, medications and problem list.  See Pregnancy episode for any changes.  Well appearing, Alert & oriented  Skin warm & dry  Normal range of motion in all extremities.    Abdomen soft  nontender  Normal resp effort    She has been experiencing anxiety. Will forward chart to  intake for assessment and plan.     HANS Santamaria   02/11/25    Follow up in about 4 weeks (around 3/11/2025) for KIANA.

## 2025-02-17 ENCOUNTER — HOSPITAL ENCOUNTER (OUTPATIENT)
Dept: RADIOLOGY | Facility: CLINIC | Age: 35
Discharge: HOME | End: 2025-02-17
Payer: COMMERCIAL

## 2025-02-17 DIAGNOSIS — Z34.90 PRENATAL CARE, ANTEPARTUM (HHS-HCC): ICD-10-CM

## 2025-02-17 PROCEDURE — 76815 OB US LIMITED FETUS(S): CPT | Performed by: OBSTETRICS & GYNECOLOGY

## 2025-02-17 PROCEDURE — 76815 OB US LIMITED FETUS(S): CPT

## 2025-03-11 ENCOUNTER — HOSPITAL ENCOUNTER (OUTPATIENT)
Dept: RADIOLOGY | Facility: CLINIC | Age: 35
Discharge: HOME | End: 2025-03-11
Payer: COMMERCIAL

## 2025-03-11 ENCOUNTER — APPOINTMENT (OUTPATIENT)
Dept: OBSTETRICS AND GYNECOLOGY | Facility: CLINIC | Age: 35
End: 2025-03-11
Payer: COMMERCIAL

## 2025-03-11 VITALS — SYSTOLIC BLOOD PRESSURE: 121 MMHG | BODY MASS INDEX: 37.76 KG/M2 | WEIGHT: 220 LBS | DIASTOLIC BLOOD PRESSURE: 80 MMHG

## 2025-03-11 DIAGNOSIS — Z3A.19 19 WEEKS GESTATION OF PREGNANCY (HHS-HCC): ICD-10-CM

## 2025-03-11 DIAGNOSIS — K21.9 GASTROESOPHAGEAL REFLUX DISEASE, UNSPECIFIED WHETHER ESOPHAGITIS PRESENT: Primary | ICD-10-CM

## 2025-03-11 DIAGNOSIS — Z34.90 PRENATAL CARE, ANTEPARTUM (HHS-HCC): ICD-10-CM

## 2025-03-11 PROCEDURE — 0501F PRENATAL FLOW SHEET: CPT | Performed by: STUDENT IN AN ORGANIZED HEALTH CARE EDUCATION/TRAINING PROGRAM

## 2025-03-11 PROCEDURE — 76811 OB US DETAILED SNGL FETUS: CPT

## 2025-03-11 RX ORDER — FAMOTIDINE 20 MG/1
20 TABLET, FILM COATED ORAL 2 TIMES DAILY
Qty: 90 TABLET | Refills: 3 | Status: SHIPPED | OUTPATIENT
Start: 2025-03-11 | End: 2026-03-11

## 2025-03-11 NOTE — PROGRESS NOTES
Nathan Jolly is a 34 y.o.  at 19w2d GA here for OB visit.      Subjective     No acute complaints.  Denies vaginal bleeding, leakage of fluid, painful regular uterine contractions, decreased fetal movement.     OB History    Para Term  AB Living   5 3 3 0 1 3   SAB IAB Ectopic Multiple Live Births   0 1 0 0 3      # Outcome Date GA Lbr Bradford/2nd Weight Sex Type Anes PTL Lv   5 Current            4 IAB 2024 17w0d             Complications: Maternal care for other (suspected) fetal abnormality and damage, fetal cardiac anomalies, fetus 1   3 Term 18 40w0d  3.232 kg F Vag-Spont EPI N LIN   2 Term 11/27/15 40w0d  3.232 kg M Vag-Spont EPI N LIN      Complications: Placenta, retained ([facility])   1 Term 13 39w0d  3.232 kg M Vag-Spont EPI N LIN      Complications: Placenta, retained ([facility])          Objective   Physical Exam  Weight: 99.8 kg (220 lb)  Expected Total Weight Gain: 5 kg (11 lb)-9 kg (19 lb)   Pregravid BMI: 37.57  BP: 121/80  --     --  Fetal Heart Rate: 145             --  Physical Exam  Constitutional:       General: She is not in acute distress.     Appearance: She is not ill-appearing, toxic-appearing or diaphoretic.   Pulmonary:      Effort: Pulmonary effort is normal.   Neurological:      Mental Status: She is alert.   Psychiatric:         Mood and Affect: Mood normal.   Vitals reviewed.          ASSESSMENT & PLAN    Nathan Jolly is a 34 y.o.  at 19w2d here for the following concerns we addressed today:    Problem List Items Addressed This Visit       19 weeks gestation of pregnancy ([facility])    Overview     2025  Desired provider in labor: [] CNM  [] Physician  [x] Blood Products: [] Yes, accepts [] No, needs counseling  [x] Initial BMI: 37.57   [x] Prenatal Labs:   [] Cervical Cancer Screening up to date  [x] Rh status: pos  [x] Genetic Screening:  low risk  [x] NT US: (11-13 wks) WNL  [x] Baby ASA (if indicated): yes  [x] Pregnancy dated by: LMP    []  Anatomy US: (19-20 wks)  [] Federal Sterilization consent signed (if indicated):  [] 1hr GCT at 24-28wks:  [] Rhogam (if indicated):   [] Fetal Surveillance (if indicated):  [] Tdap (27-32 wks, may be given up to 36 wks if initial window missed):   [] RSV (32-36 wks) (Sept. to end of Jan):   [] Flu Vaccine:    [] Breastfeeding:  [] Postpartum Birth control method:   [] GBS at 36 - 37 wks:  [] 39 weeks discussion of IOL vs. Expectant management:  [] Mode of delivery ( anticipated ):          Gastroesophageal reflux disease - Primary    Relevant Medications    famotidine (Pepcid) 20 mg tablet         KIANA. Main issue is GERD.    Follow up in 4 weeks      Darron Goode MD

## 2025-04-08 ENCOUNTER — APPOINTMENT (OUTPATIENT)
Dept: OBSTETRICS AND GYNECOLOGY | Facility: CLINIC | Age: 35
End: 2025-04-08
Payer: COMMERCIAL

## 2025-04-08 VITALS — DIASTOLIC BLOOD PRESSURE: 71 MMHG | BODY MASS INDEX: 38.55 KG/M2 | WEIGHT: 224.6 LBS | SYSTOLIC BLOOD PRESSURE: 111 MMHG

## 2025-04-08 DIAGNOSIS — R51.9 HEADACHE IN PREGNANCY, ANTEPARTUM, SECOND TRIMESTER (HHS-HCC): Primary | ICD-10-CM

## 2025-04-08 DIAGNOSIS — O26.892 HEADACHE IN PREGNANCY, ANTEPARTUM, SECOND TRIMESTER (HHS-HCC): Primary | ICD-10-CM

## 2025-04-08 DIAGNOSIS — Z3A.23 23 WEEKS GESTATION OF PREGNANCY (HHS-HCC): ICD-10-CM

## 2025-04-08 PROCEDURE — 0501F PRENATAL FLOW SHEET: CPT | Performed by: STUDENT IN AN ORGANIZED HEALTH CARE EDUCATION/TRAINING PROGRAM

## 2025-04-08 RX ORDER — LANOLIN ALCOHOL/MO/W.PET/CERES
400 CREAM (GRAM) TOPICAL DAILY
Qty: 30 TABLET | Refills: 11 | Status: SHIPPED | OUTPATIENT
Start: 2025-04-08 | End: 2026-04-08

## 2025-04-08 ASSESSMENT — ENCOUNTER SYMPTOMS
DEPRESSION: 0
LOSS OF SENSATION IN FEET: 0
OCCASIONAL FEELINGS OF UNSTEADINESS: 0

## 2025-04-08 NOTE — PROGRESS NOTES
Nathan Jolly is a 34 y.o.  at 23w2d GA here for OB visit.      Subjective     No acute complaints.  Denies vaginal bleeding, leakage of fluid, painful regular uterine contractions, decreased fetal movement.     OB History    Para Term  AB Living   5 3 3 0 1 3   SAB IAB Ectopic Multiple Live Births   0 1 0 0 3      # Outcome Date GA Lbr Bradford/2nd Weight Sex Type Anes PTL Lv   5 Current            4 IAB 2024 17w0d             Complications: Maternal care for other (suspected) fetal abnormality and damage, fetal cardiac anomalies, fetus 1   3 Term 18 40w0d  3.232 kg F Vag-Spont EPI N LIN   2 Term 11/27/15 40w0d  3.232 kg M Vag-Spont EPI N LIN      Complications: Placenta, retained ([facility])   1 Term 13 39w0d  3.232 kg M Vag-Spont EPI N LIN      Complications: Placenta, retained ([facility])          Objective   Physical Exam  Weight: 102 kg (224 lb 9.6 oz)  Expected Total Weight Gain: 5 kg (11 lb)-9 kg (19 lb)   Pregravid BMI: 37.57  BP: 111/71  --     --  Fetal Heart Rate: 155             --  Physical Exam  Constitutional:       General: She is not in acute distress.     Appearance: She is not ill-appearing, toxic-appearing or diaphoretic.   Pulmonary:      Effort: Pulmonary effort is normal.   Neurological:      Mental Status: She is alert.   Psychiatric:         Mood and Affect: Mood normal.   Vitals reviewed.          ASSESSMENT & PLAN    Nathan Jolly is a 34 y.o.  at 23w2d here for the following concerns we addressed today:    Problem List Items Addressed This Visit       23 weeks gestation of pregnancy ([facility])    Overview     2025  Desired provider in labor: [] CNM  [] Physician  [x] Blood Products: [] Yes, accepts [] No, needs counseling  [x] Initial BMI: 37.57   [x] Prenatal Labs:   [] Cervical Cancer Screening up to date  [x] Rh status: pos  [x] Genetic Screening:  low risk  [x] NT US: (11-13 wks) WNL  [x] Baby ASA (if indicated): yes  [x] Pregnancy dated by:  LMP    [] Anatomy US: (19-20 wks)  [] Federal Sterilization consent signed (if indicated):  [] 1hr GCT at 24-28wks:  [] Rhogam (if indicated):   [] Fetal Surveillance (if indicated):  [] Tdap (27-32 wks, may be given up to 36 wks if initial window missed):   [] RSV (32-36 wks) (Sept. to end of Jan):   [] Flu Vaccine:    [] Breastfeeding:  [] Postpartum Birth control method:   [] GBS at 36 - 37 wks:  [] 39 weeks discussion of IOL vs. Expectant management:  [] Mode of delivery ( anticipated ):          Relevant Orders    Syphilis Screen with Reflex    CBC Anemia Panel With Reflex,Pregnancy    Glucose, 1 Hour Screen, Pregnancy     Other Visit Diagnoses       Headache in pregnancy, antepartum, second trimester ([facility])    -  Primary    Relevant Medications    magnesium oxide (Mag-Ox) 400 mg (241.3 mg magnesium) tablet              Headaches night awakenings without coughing. Start mag oxide.    Follow up in 4 weeks      Darron Goode MD

## 2025-04-08 NOTE — LETTER
"  Below is a list of medications that is safe to take during pregnancy, that is available for you to purchase over the counter at any drugstore.      Type of Remedy: Cold and Flu  Safe Medications to Take During Pregnancy  Diphenhydramine (Benadryl)*  Dextromethorphan (Robitussin®)*  Guaifenesin (Mucinex® [plain]) BLUE BOX  Vicks Vapor Rub® mentholated cream  Mentholated or non-mentholated cough drops  (Sugar-free cough drops for gestational diabetes should not contain blends of herbs or aspartame)  Acetaminophen (Tylenol®)*  Saline nasal drops or spray  Warm salt/water gargle  *Note: Do not take the \"SA\" (Sustained Action) form of these drugs or the \"Multi-Symptom\" form of these drugs or anything Night Time or Maximum Strength.   Do not use Nyquil® due to its high alcohol content.   "

## 2025-04-29 ENCOUNTER — LAB (OUTPATIENT)
Dept: LAB | Facility: HOSPITAL | Age: 35
End: 2025-04-29
Payer: COMMERCIAL

## 2025-04-29 LAB
ERYTHROCYTE [DISTWIDTH] IN BLOOD BY AUTOMATED COUNT: 14.1 % (ref 11.5–14.5)
HCT VFR BLD AUTO: 37.4 % (ref 36–46)
HGB BLD-MCNC: 11.4 G/DL (ref 12–16)
MCH RBC QN AUTO: 27.5 PG (ref 26–34)
MCHC RBC AUTO-ENTMCNC: 30.5 G/DL (ref 32–36)
MCV RBC AUTO: 90 FL (ref 80–100)
NRBC BLD-RTO: 0 /100 WBCS (ref 0–0)
PLATELET # BLD AUTO: 238 X10*3/UL (ref 150–450)
RBC # BLD AUTO: 4.15 X10*6/UL (ref 4–5.2)
REFLEX ADDED, ANEMIA PANEL: NORMAL
WBC # BLD AUTO: 8.2 X10*3/UL (ref 4.4–11.3)

## 2025-04-29 PROCEDURE — 85027 COMPLETE CBC AUTOMATED: CPT

## 2025-04-30 LAB
GLUCOSE 1H P 50 G GLC PO SERPL-MCNC: 166 MG/DL
T PALLIDUM AB SER QL IA: NORMAL

## 2025-05-05 LAB
GLUCOSE 1H P 50 G GLC PO SERPL-MCNC: 166 MG/DL
T PALLIDUM AB SER QL IA: NEGATIVE

## 2025-05-06 ENCOUNTER — APPOINTMENT (OUTPATIENT)
Dept: OBSTETRICS AND GYNECOLOGY | Facility: CLINIC | Age: 35
End: 2025-05-06
Payer: COMMERCIAL

## 2025-05-06 VITALS — DIASTOLIC BLOOD PRESSURE: 74 MMHG | WEIGHT: 220 LBS | BODY MASS INDEX: 37.76 KG/M2 | SYSTOLIC BLOOD PRESSURE: 117 MMHG

## 2025-05-06 DIAGNOSIS — Z71.85 IMMUNIZATION COUNSELING: ICD-10-CM

## 2025-05-06 DIAGNOSIS — O99.810 ABNORMAL O'SULLIVAN GLUCOSE CHALLENGE TEST, ANTEPARTUM (HHS-HCC): ICD-10-CM

## 2025-05-06 DIAGNOSIS — Z3A.27 27 WEEKS GESTATION OF PREGNANCY (HHS-HCC): Primary | ICD-10-CM

## 2025-05-06 PROCEDURE — 90471 IMMUNIZATION ADMIN: CPT | Performed by: STUDENT IN AN ORGANIZED HEALTH CARE EDUCATION/TRAINING PROGRAM

## 2025-05-06 PROCEDURE — 0501F PRENATAL FLOW SHEET: CPT | Performed by: STUDENT IN AN ORGANIZED HEALTH CARE EDUCATION/TRAINING PROGRAM

## 2025-05-06 PROCEDURE — 90715 TDAP VACCINE 7 YRS/> IM: CPT | Performed by: STUDENT IN AN ORGANIZED HEALTH CARE EDUCATION/TRAINING PROGRAM

## 2025-05-06 NOTE — PROGRESS NOTES
Nathan Jolly is a 34 y.o.  at 27w2d GA here for OB visit.      Subjective     No acute complaints.  Denies vaginal bleeding, leakage of fluid, painful regular uterine contractions, decreased fetal movement.     OB History    Para Term  AB Living   5 3 3 0 1 3   SAB IAB Ectopic Multiple Live Births   0 1 0 0 3      # Outcome Date GA Lbr Bradford/2nd Weight Sex Type Anes PTL Lv   5 Current            4 IAB 2024 17w0d             Complications: Maternal care for other (suspected) fetal abnormality and damage, fetal cardiac anomalies, fetus 1   3 Term 18 40w0d  3.232 kg F Vag-Spont EPI N LIN   2 Term 11/27/15 40w0d  3.232 kg M Vag-Spont EPI N LIN      Complications: Placenta, retained ([facility])   1 Term 13 39w0d  3.232 kg M Vag-Spont EPI N LIN      Complications: Placenta, retained ([facility])       Objective   Physical Exam  Weight: 99.8 kg (220 lb)  Expected Total Weight Gain: 5 kg (11 lb)-9 kg (19 lb)   Pregravid BMI: 37.57  BP: 117/74     --     --  Fetal Heart Rate: 155 Fundal Height (cm): 30 cm           --  Physical Exam  Constitutional:       General: She is not in acute distress.     Appearance: She is not ill-appearing, toxic-appearing or diaphoretic.   Pulmonary:      Effort: Pulmonary effort is normal.   Neurological:      Mental Status: She is alert.   Psychiatric:         Mood and Affect: Mood normal.   Vitals reviewed.          ASSESSMENT & PLAN    Nathan Jolly is a 34 y.o.  at 27w2d here for the following concerns we addressed today:    Problem List Items Addressed This Visit       27 weeks gestation of pregnancy ([facility]) - Primary    Overview   Desired provider in labor: [] CNM  [] Physician  [x] Blood Products: [] Yes, accepts [] No, needs counseling  [x] Initial BMI: 37.57   [x] Prenatal Labs:   [] Cervical Cancer Screening up to date  [x] Rh status: pos  [x] Genetic Screening:  low risk  [x] NT US: (11-13 wks) WNL  [x] Baby ASA (if indicated): yes  [x]  Pregnancy dated by: LMP    [] Anatomy US: (19-20 wks)  [] Federal Sterilization consent signed (if indicated):  [] 1hr GCT at 24-28wks: pending 3hr GTT  [x] Rhogam (if indicated): N?A  [] Fetal Surveillance (if indicated):  [] Tdap (27-32 wks, may be given up to 36 wks if initial window missed):   [x] RSV (32-36 wks) (Sept. to end of Jan):   [x] Flu Vaccine:    [x] Breastfeeding: yes  [] Postpartum Birth control method: consideringCu-IUD  [] GBS at 36 - 37 wks:  [] 39 weeks discussion of IOL vs. Expectant management:  [] Mode of delivery ( anticipated ):          Abnormal [place] glucose challenge test, antepartum ([facility])    Relevant Orders    Glucose Tolerance Test, 3 hour (Pregnancy)     Other Visit Diagnoses         Immunization counseling                  Abn 1hr GCT. Needs 3hr. Tdap today with cousneling.     Follow up in 4 weeks      Darron Goode MD

## 2025-05-23 ENCOUNTER — PATIENT MESSAGE (OUTPATIENT)
Dept: OBSTETRICS AND GYNECOLOGY | Facility: CLINIC | Age: 35
End: 2025-05-23
Payer: COMMERCIAL

## 2025-05-23 LAB
GLUCOSE 1H P CHAL SERPL-MCNC: 139 MG/DL
GLUCOSE 2H P CHAL SERPL-MCNC: 115 MG/DL
GLUCOSE 3H P 100 G GLC PO SERPL-MCNC: 102 MG/DL
GLUCOSE P FAST SERPL-MCNC: 72 MG/DL (ref 65–94)
SERVICE CMNT-IMP: NORMAL

## 2025-05-29 ENCOUNTER — HOSPITAL ENCOUNTER (OUTPATIENT)
Dept: RADIOLOGY | Facility: CLINIC | Age: 35
Discharge: HOME | End: 2025-05-29
Payer: COMMERCIAL

## 2025-05-29 DIAGNOSIS — Z34.90 PRENATAL CARE, ANTEPARTUM: ICD-10-CM

## 2025-05-29 PROCEDURE — 76816 OB US FOLLOW-UP PER FETUS: CPT

## 2025-05-29 PROCEDURE — 76819 FETAL BIOPHYS PROFIL W/O NST: CPT

## 2025-06-04 ENCOUNTER — APPOINTMENT (OUTPATIENT)
Dept: OBSTETRICS AND GYNECOLOGY | Facility: CLINIC | Age: 35
End: 2025-06-04
Payer: COMMERCIAL

## 2025-06-04 VITALS — BODY MASS INDEX: 38.96 KG/M2 | WEIGHT: 227 LBS | DIASTOLIC BLOOD PRESSURE: 78 MMHG | SYSTOLIC BLOOD PRESSURE: 119 MMHG

## 2025-06-04 DIAGNOSIS — Z3A.31 31 WEEKS GESTATION OF PREGNANCY (HHS-HCC): Primary | ICD-10-CM

## 2025-06-04 PROCEDURE — 0501F PRENATAL FLOW SHEET: CPT | Performed by: STUDENT IN AN ORGANIZED HEALTH CARE EDUCATION/TRAINING PROGRAM

## 2025-06-04 NOTE — PROGRESS NOTES
Nathan Jolly is a 34 y.o.  at 31w3d GA here for OB visit.      Subjective     No acute complaints.  Denies vaginal bleeding, leakage of fluid, painful regular uterine contractions, decreased fetal movement.     OB History    Para Term  AB Living   5 3 3 0 1 3   SAB IAB Ectopic Multiple Live Births   0 1 0 0 3      # Outcome Date GA Lbr Bradford/2nd Weight Sex Type Anes PTL Lv   5 Current            4 IAB 2024 17w0d             Complications: Maternal care for other (suspected) fetal abnormality and damage, fetal cardiac anomalies, fetus 1   3 Term 18 40w0d  3.232 kg F Vag-Spont EPI N LIN   2 Term 11/27/15 40w0d  3.232 kg M Vag-Spont EPI N LIN      Complications: Placenta, retained ([facility])   1 Term 13 39w0d  3.232 kg M Vag-Spont EPI N LIN      Complications: Placenta, retained ([facility])       Objective   Physical Exam  Weight: 103 kg (227 lb)  Expected Total Weight Gain: 5 kg (11 lb)-9 kg (19 lb)   Pregravid BMI: 37.57  BP: 119/78     --     --  Fetal Heart Rate: 150             --  Physical Exam  Constitutional:       General: She is not in acute distress.     Appearance: She is not ill-appearing, toxic-appearing or diaphoretic.   Pulmonary:      Effort: Pulmonary effort is normal.   Neurological:      Mental Status: She is alert.   Psychiatric:         Mood and Affect: Mood normal.   Vitals reviewed.          ASSESSMENT & PLAN    Nathan Jolly is a 34 y.o.  at 31w3d here for the following concerns we addressed today:    Problem List Items Addressed This Visit       31 weeks gestation of pregnancy ([facility]) - Primary    Overview   Desired provider in labor: [] CNM  [] Physician  [x] Blood Products: [] Yes, accepts [] No, needs counseling  [x] Initial BMI: 37.57   [x] Prenatal Labs:   [] Cervical Cancer Screening up to date  [x] Rh status: pos  [x] Genetic Screening:  low risk  [x] NT US: (11-13 wks) WNL  [x] Baby ASA (if indicated): yes  [x] Pregnancy dated by: LMP    []  Anatomy US: (19-20 wks)  [] Federal Sterilization consent signed (if indicated):  [] 1hr GCT at 24-28wks: pending 3hr GTT  [x] Rhogam (if indicated): N?A  [] Fetal Surveillance (if indicated):  [] Tdap (27-32 wks, may be given up to 36 wks if initial window missed):   [x] RSV (32-36 wks) (Sept. to end ):   [x] Flu Vaccine:    [x] Breastfeeding: yes  [] Postpartum Birth control method: consideringCu-IUD  [] GBS at 36 - 37 wks:  [] 39 weeks discussion of IOL vs. Expectant management:  [] Mode of delivery ( anticipated ):               KIANA.  Discussed recent ultrasound result with EFW at the 83rd percentile.  Imaging reviewed and biometric parameters are all in the larger end of the normal range.  Patient is scheduled for growth scan at 36 weeks.  Counseled patient that while this appears to be a bigger baby there is not yet indication for  delivery or  interventions.    Follow up in 3 weeks      Darron Goode MD

## 2025-06-25 ENCOUNTER — APPOINTMENT (OUTPATIENT)
Dept: OBSTETRICS AND GYNECOLOGY | Facility: CLINIC | Age: 35
End: 2025-06-25
Payer: COMMERCIAL

## 2025-06-30 ENCOUNTER — PATIENT MESSAGE (OUTPATIENT)
Dept: OBSTETRICS AND GYNECOLOGY | Facility: CLINIC | Age: 35
End: 2025-06-30
Payer: COMMERCIAL

## 2025-07-08 ENCOUNTER — HOSPITAL ENCOUNTER (OUTPATIENT)
Dept: RADIOLOGY | Facility: CLINIC | Age: 35
Discharge: HOME | End: 2025-07-08
Payer: COMMERCIAL

## 2025-07-08 DIAGNOSIS — Z34.90 PRENATAL CARE, ANTEPARTUM: ICD-10-CM

## 2025-07-08 DIAGNOSIS — O99.213 OBESITY COMPLICATING PREGNANCY, THIRD TRIMESTER (HHS-HCC): ICD-10-CM

## 2025-07-08 PROCEDURE — 76816 OB US FOLLOW-UP PER FETUS: CPT

## 2025-07-08 PROCEDURE — 76816 OB US FOLLOW-UP PER FETUS: CPT | Performed by: STUDENT IN AN ORGANIZED HEALTH CARE EDUCATION/TRAINING PROGRAM

## 2025-07-08 PROCEDURE — 76819 FETAL BIOPHYS PROFIL W/O NST: CPT

## 2025-07-08 PROCEDURE — 76819 FETAL BIOPHYS PROFIL W/O NST: CPT | Performed by: STUDENT IN AN ORGANIZED HEALTH CARE EDUCATION/TRAINING PROGRAM

## 2025-07-09 ENCOUNTER — APPOINTMENT (OUTPATIENT)
Dept: OBSTETRICS AND GYNECOLOGY | Facility: CLINIC | Age: 35
End: 2025-07-09
Payer: COMMERCIAL

## 2025-07-09 VITALS — BODY MASS INDEX: 38.83 KG/M2 | SYSTOLIC BLOOD PRESSURE: 116 MMHG | DIASTOLIC BLOOD PRESSURE: 77 MMHG | WEIGHT: 226.2 LBS

## 2025-07-09 DIAGNOSIS — Z3A.36 36 WEEKS GESTATION OF PREGNANCY (HHS-HCC): Primary | ICD-10-CM

## 2025-07-09 PROCEDURE — 0501F PRENATAL FLOW SHEET: CPT | Performed by: STUDENT IN AN ORGANIZED HEALTH CARE EDUCATION/TRAINING PROGRAM

## 2025-07-09 NOTE — PROGRESS NOTES
Nathan Jolly is a 34 y.o.  at 36w3d GA here for OB visit.      Subjective     No acute complaints.  Denies vaginal bleeding, leakage of fluid, painful regular uterine contractions, decreased fetal movement.     OB History    Para Term  AB Living   5 3 3 0 1 3   SAB IAB Ectopic Multiple Live Births   0 1 0 0 3      # Outcome Date GA Lbr Bradford/2nd Weight Sex Type Anes PTL Lv   5 Current            4 IAB 2024 17w0d             Complications: Maternal care for other (suspected) fetal abnormality and damage, fetal cardiac anomalies, fetus 1   3 Term 18 40w0d  3.232 kg F Vag-Spont EPI N LIN   2 Term 11/27/15 40w0d  3.232 kg M Vag-Spont EPI N LIN      Complications: Placenta, retained ([facility])   1 Term 13 39w0d  3.232 kg M Vag-Spont EPI N LIN      Complications: Placenta, retained ([facility])       Objective   Physical Exam  Weight: 103 kg (226 lb 3.2 oz)  Expected Total Weight Gain: 5 kg (11 lb)-9 kg (19 lb)   Pregravid BMI: 37.57  BP: 116/77     --     --  Fetal Heart Rate: 137 Fundal Height (cm): 36 cm           --  Physical Exam  Constitutional:       General: She is not in acute distress.     Appearance: She is not ill-appearing, toxic-appearing or diaphoretic.   Pulmonary:      Effort: Pulmonary effort is normal.   Neurological:      Mental Status: She is alert.   Psychiatric:         Mood and Affect: Mood normal.   Vitals reviewed. Exam conducted with a chaperone present.          ASSESSMENT & PLAN    Nathan Jolly is a 34 y.o.  at 36w3d here for the following concerns we addressed today:    Problem List Items Addressed This Visit       36 weeks gestation of pregnancy ([facility]) - Primary    Overview   Desired provider in labor: [] CNM  [] Physician  [x] Blood Products: [] Yes, accepts [] No, needs counseling  [x] Initial BMI: 37.57   [x] Prenatal Labs:   [] Cervical Cancer Screening up to date  [x] Rh status: pos  [x] Genetic Screening:  low risk  [x] NT US: (11-13 wks)  WNL  [x] Baby ASA (if indicated): yes  [x] Pregnancy dated by: LMP    [x] Anatomy US: (19-20 wks) WNL  [] Federal Sterilization consent signed (if indicated):  [x] 1hr GCT at 24-28wks: pending 3hr GTT WNL  [x] Rhogam (if indicated): N?A  [] Fetal Surveillance (if indicated):  [] Tdap (27-32 wks, may be given up to 36 wks if initial window missed):   [x] RSV (32-36 wks) (Sept. to end of Jan):   [x] Flu Vaccine:    [x] Breastfeeding: yes  [x] Postpartum Birth control method: post placental Cu-IUD  [] GBS at 36 - 37 wks: pending  [x] 39 weeks discussion of IOL vs. Expectant management: Declines  [x] Mode of delivery ( anticipated ): spont labor         Relevant Orders    Group B Streptococcus (GBS) Prenatal Screen, Culture         GBS today. Int UCs    Follow up in 1 weeks      Darron Goode MD

## 2025-07-12 LAB — GP B STREP SPEC QL CULT: NORMAL

## 2025-07-15 ENCOUNTER — ANESTHESIA (OUTPATIENT)
Dept: OBSTETRICS AND GYNECOLOGY | Facility: HOSPITAL | Age: 35
End: 2025-07-15
Payer: COMMERCIAL

## 2025-07-15 ENCOUNTER — ANESTHESIA EVENT (OUTPATIENT)
Dept: OBSTETRICS AND GYNECOLOGY | Facility: HOSPITAL | Age: 35
End: 2025-07-15
Payer: COMMERCIAL

## 2025-07-15 ENCOUNTER — HOSPITAL ENCOUNTER (INPATIENT)
Facility: HOSPITAL | Age: 35
LOS: 2 days | Discharge: HOME | End: 2025-07-17
Attending: STUDENT IN AN ORGANIZED HEALTH CARE EDUCATION/TRAINING PROGRAM | Admitting: STUDENT IN AN ORGANIZED HEALTH CARE EDUCATION/TRAINING PROGRAM
Payer: COMMERCIAL

## 2025-07-15 PROBLEM — Z3A.37 37 WEEKS GESTATION OF PREGNANCY (HHS-HCC): Status: ACTIVE | Noted: 2025-01-07

## 2025-07-15 LAB
ABO GROUP (TYPE) IN BLOOD: NORMAL
ANTIBODY SCREEN: NORMAL
ERYTHROCYTE [DISTWIDTH] IN BLOOD BY AUTOMATED COUNT: 14.8 % (ref 11.5–14.5)
HCT VFR BLD AUTO: 36 % (ref 36–46)
HGB BLD-MCNC: 12.1 G/DL (ref 12–16)
MCH RBC QN AUTO: 28.5 PG (ref 26–34)
MCHC RBC AUTO-ENTMCNC: 33.6 G/DL (ref 32–36)
MCV RBC AUTO: 85 FL (ref 80–100)
NRBC BLD-RTO: 0 /100 WBCS (ref 0–0)
PLATELET # BLD AUTO: 218 X10*3/UL (ref 150–450)
RBC # BLD AUTO: 4.24 X10*6/UL (ref 4–5.2)
RH FACTOR (ANTIGEN D): NORMAL
TREPONEMA PALLIDUM IGG+IGM AB [PRESENCE] IN SERUM OR PLASMA BY IMMUNOASSAY: NONREACTIVE
WBC # BLD AUTO: 7.7 X10*3/UL (ref 4.4–11.3)

## 2025-07-15 PROCEDURE — 85027 COMPLETE CBC AUTOMATED: CPT | Performed by: STUDENT IN AN ORGANIZED HEALTH CARE EDUCATION/TRAINING PROGRAM

## 2025-07-15 PROCEDURE — 36415 COLL VENOUS BLD VENIPUNCTURE: CPT | Performed by: STUDENT IN AN ORGANIZED HEALTH CARE EDUCATION/TRAINING PROGRAM

## 2025-07-15 PROCEDURE — 86850 RBC ANTIBODY SCREEN: CPT | Performed by: STUDENT IN AN ORGANIZED HEALTH CARE EDUCATION/TRAINING PROGRAM

## 2025-07-15 PROCEDURE — 2720000007 HC OR 272 NO HCPCS

## 2025-07-15 PROCEDURE — 3E033VJ INTRODUCTION OF OTHER HORMONE INTO PERIPHERAL VEIN, PERCUTANEOUS APPROACH: ICD-10-PCS | Performed by: ADVANCED PRACTICE MIDWIFE

## 2025-07-15 PROCEDURE — 3700000014 EPIDURAL BLOCK: Performed by: NURSE ANESTHETIST, CERTIFIED REGISTERED

## 2025-07-15 PROCEDURE — 2500000004 HC RX 250 GENERAL PHARMACY W/ HCPCS (ALT 636 FOR OP/ED): Performed by: NURSE ANESTHETIST, CERTIFIED REGISTERED

## 2025-07-15 PROCEDURE — 1220000001 HC OB SEMI-PRIVATE ROOM DAILY

## 2025-07-15 PROCEDURE — 2500000004 HC RX 250 GENERAL PHARMACY W/ HCPCS (ALT 636 FOR OP/ED): Mod: JW | Performed by: NURSE ANESTHETIST, CERTIFIED REGISTERED

## 2025-07-15 PROCEDURE — 86780 TREPONEMA PALLIDUM: CPT | Mod: AHULAB | Performed by: STUDENT IN AN ORGANIZED HEALTH CARE EDUCATION/TRAINING PROGRAM

## 2025-07-15 PROCEDURE — 86923 COMPATIBILITY TEST ELECTRIC: CPT

## 2025-07-15 PROCEDURE — 2500000004 HC RX 250 GENERAL PHARMACY W/ HCPCS (ALT 636 FOR OP/ED): Performed by: STUDENT IN AN ORGANIZED HEALTH CARE EDUCATION/TRAINING PROGRAM

## 2025-07-15 PROCEDURE — 51701 INSERT BLADDER CATHETER: CPT

## 2025-07-15 PROCEDURE — 2500000004 HC RX 250 GENERAL PHARMACY W/ HCPCS (ALT 636 FOR OP/ED): Performed by: ADVANCED PRACTICE MIDWIFE

## 2025-07-15 PROCEDURE — 2500000001 HC RX 250 WO HCPCS SELF ADMINISTERED DRUGS (ALT 637 FOR MEDICARE OP): Performed by: STUDENT IN AN ORGANIZED HEALTH CARE EDUCATION/TRAINING PROGRAM

## 2025-07-15 RX ORDER — CARBOPROST TROMETHAMINE 250 UG/ML
250 INJECTION, SOLUTION INTRAMUSCULAR ONCE AS NEEDED
Status: DISCONTINUED | OUTPATIENT
Start: 2025-07-15 | End: 2025-07-16

## 2025-07-15 RX ORDER — TRANEXAMIC ACID 1 G/10ML
1000 INJECTION, SOLUTION INTRAVENOUS ONCE AS NEEDED
Status: DISCONTINUED | OUTPATIENT
Start: 2025-07-15 | End: 2025-07-16

## 2025-07-15 RX ORDER — ONDANSETRON HYDROCHLORIDE 2 MG/ML
4 INJECTION, SOLUTION INTRAVENOUS EVERY 6 HOURS PRN
Status: DISCONTINUED | OUTPATIENT
Start: 2025-07-15 | End: 2025-07-15 | Stop reason: SDUPTHER

## 2025-07-15 RX ORDER — LIDOCAINE HCL/EPINEPHRINE/PF 2%-1:200K
VIAL (ML) INJECTION AS NEEDED
Status: DISCONTINUED | OUTPATIENT
Start: 2025-07-15 | End: 2025-07-16

## 2025-07-15 RX ORDER — LABETALOL HYDROCHLORIDE 5 MG/ML
20 INJECTION, SOLUTION INTRAVENOUS ONCE AS NEEDED
Status: DISCONTINUED | OUTPATIENT
Start: 2025-07-15 | End: 2025-07-16

## 2025-07-15 RX ORDER — OXYTOCIN/0.9 % SODIUM CHLORIDE 30/500 ML
60 PLASTIC BAG, INJECTION (ML) INTRAVENOUS ONCE AS NEEDED
Status: DISCONTINUED | OUTPATIENT
Start: 2025-07-15 | End: 2025-07-16

## 2025-07-15 RX ORDER — OXYTOCIN/0.9 % SODIUM CHLORIDE 30/500 ML
2-30 PLASTIC BAG, INJECTION (ML) INTRAVENOUS CONTINUOUS
Status: DISCONTINUED | OUTPATIENT
Start: 2025-07-15 | End: 2025-07-16

## 2025-07-15 RX ORDER — MISOPROSTOL 200 UG/1
800 TABLET ORAL ONCE AS NEEDED
Status: DISCONTINUED | OUTPATIENT
Start: 2025-07-15 | End: 2025-07-16

## 2025-07-15 RX ORDER — METHYLERGONOVINE MALEATE 0.2 MG/ML
0.2 INJECTION INTRAVENOUS ONCE AS NEEDED
Status: DISCONTINUED | OUTPATIENT
Start: 2025-07-15 | End: 2025-07-16

## 2025-07-15 RX ORDER — ONDANSETRON 4 MG/1
4 TABLET, FILM COATED ORAL EVERY 6 HOURS PRN
Status: DISCONTINUED | OUTPATIENT
Start: 2025-07-15 | End: 2025-07-16

## 2025-07-15 RX ORDER — ONDANSETRON HYDROCHLORIDE 2 MG/ML
4 INJECTION, SOLUTION INTRAVENOUS EVERY 6 HOURS PRN
Status: DISCONTINUED | OUTPATIENT
Start: 2025-07-15 | End: 2025-07-16

## 2025-07-15 RX ORDER — LOPERAMIDE HYDROCHLORIDE 2 MG/1
4 CAPSULE ORAL EVERY 2 HOUR PRN
Status: DISCONTINUED | OUTPATIENT
Start: 2025-07-15 | End: 2025-07-16

## 2025-07-15 RX ORDER — HYDRALAZINE HYDROCHLORIDE 20 MG/ML
5 INJECTION INTRAMUSCULAR; INTRAVENOUS ONCE AS NEEDED
Status: DISCONTINUED | OUTPATIENT
Start: 2025-07-15 | End: 2025-07-15 | Stop reason: SDUPTHER

## 2025-07-15 RX ORDER — HYDRALAZINE HYDROCHLORIDE 20 MG/ML
5 INJECTION INTRAMUSCULAR; INTRAVENOUS ONCE AS NEEDED
Status: DISCONTINUED | OUTPATIENT
Start: 2025-07-15 | End: 2025-07-16

## 2025-07-15 RX ORDER — LIDOCAINE HYDROCHLORIDE 10 MG/ML
20 INJECTION, SOLUTION INFILTRATION; PERINEURAL ONCE AS NEEDED
Status: DISCONTINUED | OUTPATIENT
Start: 2025-07-15 | End: 2025-07-16

## 2025-07-15 RX ORDER — ONDANSETRON 4 MG/1
4 TABLET, FILM COATED ORAL EVERY 6 HOURS PRN
Status: DISCONTINUED | OUTPATIENT
Start: 2025-07-15 | End: 2025-07-15 | Stop reason: SDUPTHER

## 2025-07-15 RX ORDER — LIDOCAINE HYDROCHLORIDE 10 MG/ML
INJECTION, SOLUTION INFILTRATION; PERINEURAL AS NEEDED
Status: DISCONTINUED | OUTPATIENT
Start: 2025-07-15 | End: 2025-07-16

## 2025-07-15 RX ORDER — TERBUTALINE SULFATE 1 MG/ML
0.25 INJECTION SUBCUTANEOUS ONCE AS NEEDED
Status: DISCONTINUED | OUTPATIENT
Start: 2025-07-15 | End: 2025-07-16

## 2025-07-15 RX ORDER — LIDOCAINE HYDROCHLORIDE 10 MG/ML
0.5 INJECTION, SOLUTION EPIDURAL; INFILTRATION; INTRACAUDAL; PERINEURAL ONCE AS NEEDED
Status: DISCONTINUED | OUTPATIENT
Start: 2025-07-15 | End: 2025-07-16

## 2025-07-15 RX ORDER — OXYTOCIN 10 [USP'U]/ML
10 INJECTION, SOLUTION INTRAMUSCULAR; INTRAVENOUS ONCE AS NEEDED
Status: DISCONTINUED | OUTPATIENT
Start: 2025-07-15 | End: 2025-07-16

## 2025-07-15 RX ORDER — SODIUM CHLORIDE, SODIUM LACTATE, POTASSIUM CHLORIDE, CALCIUM CHLORIDE 600; 310; 30; 20 MG/100ML; MG/100ML; MG/100ML; MG/100ML
75 INJECTION, SOLUTION INTRAVENOUS CONTINUOUS
Status: ACTIVE | OUTPATIENT
Start: 2025-07-15 | End: 2025-07-16

## 2025-07-15 RX ORDER — FENTANYL/ROPIVACAINE/NS/PF 2MCG/ML-.2
0-25 PLASTIC BAG, INJECTION (ML) INJECTION CONTINUOUS
Status: DISCONTINUED | OUTPATIENT
Start: 2025-07-15 | End: 2025-07-16

## 2025-07-15 RX ORDER — CALCIUM CARBONATE 200(500)MG
1 TABLET,CHEWABLE ORAL EVERY 6 HOURS PRN
Status: DISCONTINUED | OUTPATIENT
Start: 2025-07-15 | End: 2025-07-17 | Stop reason: HOSPADM

## 2025-07-15 RX ORDER — LABETALOL HYDROCHLORIDE 5 MG/ML
20 INJECTION, SOLUTION INTRAVENOUS ONCE AS NEEDED
Status: DISCONTINUED | OUTPATIENT
Start: 2025-07-15 | End: 2025-07-15 | Stop reason: SDUPTHER

## 2025-07-15 RX ADMIN — Medication 2 MILLI-UNITS/MIN: at 22:22

## 2025-07-15 RX ADMIN — ONDANSETRON 4 MG: 2 INJECTION INTRAMUSCULAR; INTRAVENOUS at 23:11

## 2025-07-15 RX ADMIN — LIDOCAINE HYDROCHLORIDE,EPINEPHRINE BITARTRATE 5 ML: 20; .005 INJECTION, SOLUTION EPIDURAL; INFILTRATION; INTRACAUDAL; PERINEURAL at 21:46

## 2025-07-15 RX ADMIN — LIDOCAINE HYDROCHLORIDE 3 ML: 10 INJECTION, SOLUTION INFILTRATION; PERINEURAL at 21:42

## 2025-07-15 RX ADMIN — Medication 8 ML/HR: at 21:50

## 2025-07-15 RX ADMIN — Medication 1 TABLET: at 22:33

## 2025-07-15 SDOH — SOCIAL STABILITY: SOCIAL INSECURITY
WITHIN THE LAST YEAR, HAVE YOU BEEN KICKED, HIT, SLAPPED, OR OTHERWISE PHYSICALLY HURT BY YOUR PARTNER OR EX-PARTNER?: NO

## 2025-07-15 SDOH — HEALTH STABILITY: PHYSICAL HEALTH: ON AVERAGE, HOW MANY DAYS PER WEEK DO YOU ENGAGE IN MODERATE TO STRENUOUS EXERCISE (LIKE A BRISK WALK)?: 2 DAYS

## 2025-07-15 SDOH — SOCIAL STABILITY: SOCIAL INSECURITY: WITHIN THE LAST YEAR, HAVE YOU BEEN HUMILIATED OR EMOTIONALLY ABUSED IN OTHER WAYS BY YOUR PARTNER OR EX-PARTNER?: NO

## 2025-07-15 SDOH — HEALTH STABILITY: PHYSICAL HEALTH
HOW OFTEN DO YOU NEED TO HAVE SOMEONE HELP YOU WHEN YOU READ INSTRUCTIONS, PAMPHLETS, OR OTHER WRITTEN MATERIAL FROM YOUR DOCTOR OR PHARMACY?: NEVER

## 2025-07-15 SDOH — HEALTH STABILITY: MENTAL HEALTH: HOW OFTEN DO YOU HAVE SIX OR MORE DRINKS ON ONE OCCASION?: NEVER

## 2025-07-15 SDOH — SOCIAL STABILITY: SOCIAL INSECURITY: DOES ANYONE TRY TO KEEP YOU FROM HAVING/CONTACTING OTHER FRIENDS OR DOING THINGS OUTSIDE YOUR HOME?: NO

## 2025-07-15 SDOH — SOCIAL STABILITY: SOCIAL INSECURITY: ARE THERE ANY APPARENT SIGNS OF INJURIES/BEHAVIORS THAT COULD BE RELATED TO ABUSE/NEGLECT?: NO

## 2025-07-15 SDOH — SOCIAL STABILITY: SOCIAL INSECURITY: ARE YOU MARRIED, WIDOWED, DIVORCED, SEPARATED, NEVER MARRIED, OR LIVING WITH A PARTNER?: MARRIED

## 2025-07-15 SDOH — HEALTH STABILITY: MENTAL HEALTH: HAVE YOU USED ANY SUBSTANCES (CANABIS, COCAINE, HEROIN, HALLUCINOGENS, INHALANTS, ETC.) IN THE PAST 12 MONTHS?: NO

## 2025-07-15 SDOH — HEALTH STABILITY: MENTAL HEALTH: HOW OFTEN DO YOU HAVE A DRINK CONTAINING ALCOHOL?: NEVER

## 2025-07-15 SDOH — SOCIAL STABILITY: SOCIAL INSECURITY: VERBAL ABUSE: DENIES

## 2025-07-15 SDOH — HEALTH STABILITY: MENTAL HEALTH: HOW MANY DRINKS CONTAINING ALCOHOL DO YOU HAVE ON A TYPICAL DAY WHEN YOU ARE DRINKING?: PATIENT DOES NOT DRINK

## 2025-07-15 SDOH — ECONOMIC STABILITY: FOOD INSECURITY: WITHIN THE PAST 12 MONTHS, YOU WORRIED THAT YOUR FOOD WOULD RUN OUT BEFORE YOU GOT THE MONEY TO BUY MORE.: NEVER TRUE

## 2025-07-15 SDOH — SOCIAL STABILITY: SOCIAL INSECURITY: WITHIN THE LAST YEAR, HAVE YOU BEEN AFRAID OF YOUR PARTNER OR EX-PARTNER?: NO

## 2025-07-15 SDOH — ECONOMIC STABILITY: TRANSPORTATION INSECURITY: IN THE PAST 12 MONTHS, HAS LACK OF TRANSPORTATION KEPT YOU FROM MEDICAL APPOINTMENTS OR FROM GETTING MEDICATIONS?: NO

## 2025-07-15 SDOH — ECONOMIC STABILITY: FOOD INSECURITY: WITHIN THE PAST 12 MONTHS, THE FOOD YOU BOUGHT JUST DIDN'T LAST AND YOU DIDN'T HAVE MONEY TO GET MORE.: NEVER TRUE

## 2025-07-15 SDOH — HEALTH STABILITY: MENTAL HEALTH: WISH TO BE DEAD (PAST 1 MONTH): NO

## 2025-07-15 SDOH — SOCIAL STABILITY: SOCIAL INSECURITY: HAS ANYONE EVER THREATENED TO HURT YOUR FAMILY OR YOUR PETS?: NO

## 2025-07-15 SDOH — SOCIAL STABILITY: SOCIAL INSECURITY: PHYSICAL ABUSE: DENIES

## 2025-07-15 SDOH — SOCIAL STABILITY: SOCIAL NETWORK: HOW OFTEN DO YOU GET TOGETHER WITH FRIENDS OR RELATIVES?: MORE THAN THREE TIMES A WEEK

## 2025-07-15 SDOH — HEALTH STABILITY: PHYSICAL HEALTH: ON AVERAGE, HOW MANY MINUTES DO YOU ENGAGE IN EXERCISE AT THIS LEVEL?: 60 MIN

## 2025-07-15 SDOH — SOCIAL STABILITY: SOCIAL INSECURITY: ABUSE SCREEN: ADULT

## 2025-07-15 SDOH — SOCIAL STABILITY: SOCIAL INSECURITY
WITHIN THE LAST YEAR, HAVE YOU BEEN RAPED OR FORCED TO HAVE ANY KIND OF SEXUAL ACTIVITY BY YOUR PARTNER OR EX-PARTNER?: NO

## 2025-07-15 SDOH — SOCIAL STABILITY: SOCIAL NETWORK: HOW OFTEN DO YOU ATTEND MEETINGS OF THE CLUBS OR ORGANIZATIONS YOU BELONG TO?: PATIENT DECLINED

## 2025-07-15 SDOH — SOCIAL STABILITY: SOCIAL NETWORK
IN A TYPICAL WEEK, HOW MANY TIMES DO YOU TALK ON THE PHONE WITH FAMILY, FRIENDS, OR NEIGHBORS?: MORE THAN THREE TIMES A WEEK

## 2025-07-15 SDOH — SOCIAL STABILITY: SOCIAL NETWORK
DO YOU BELONG TO ANY CLUBS OR ORGANIZATIONS SUCH AS CHURCH GROUPS, UNIONS, FRATERNAL OR ATHLETIC GROUPS, OR SCHOOL GROUPS?: PATIENT DECLINED

## 2025-07-15 SDOH — HEALTH STABILITY: MENTAL HEALTH: SUICIDAL BEHAVIOR (LIFETIME): NO

## 2025-07-15 SDOH — HEALTH STABILITY: MENTAL HEALTH
DO YOU FEEL STRESS - TENSE, RESTLESS, NERVOUS, OR ANXIOUS, OR UNABLE TO SLEEP AT NIGHT BECAUSE YOUR MIND IS TROUBLED ALL THE TIME - THESE DAYS?: NOT AT ALL

## 2025-07-15 SDOH — HEALTH STABILITY: MENTAL HEALTH: NON-SPECIFIC ACTIVE SUICIDAL THOUGHTS (PAST 1 MONTH): NO

## 2025-07-15 SDOH — SOCIAL STABILITY: SOCIAL INSECURITY: HAVE YOU HAD ANY THOUGHTS OF HARMING ANYONE ELSE?: NO

## 2025-07-15 SDOH — ECONOMIC STABILITY: FOOD INSECURITY: HOW HARD IS IT FOR YOU TO PAY FOR THE VERY BASICS LIKE FOOD, HOUSING, MEDICAL CARE, AND HEATING?: NOT HARD AT ALL

## 2025-07-15 SDOH — SOCIAL STABILITY: SOCIAL NETWORK: HOW OFTEN DO YOU ATTEND CHURCH OR RELIGIOUS SERVICES?: PATIENT DECLINED

## 2025-07-15 SDOH — HEALTH STABILITY: MENTAL HEALTH: HAVE YOU USED ANY PRESCRIPTION DRUGS OTHER THAN PRESCRIBED IN THE PAST 12 MONTHS?: NO

## 2025-07-15 SDOH — HEALTH STABILITY: MENTAL HEALTH: CURRENT SMOKER: 0

## 2025-07-15 SDOH — HEALTH STABILITY: MENTAL HEALTH: STRENGTHS (MUST CHOOSE TWO): TECHNICAL/VOCATIONAL;SUPPORT FROM FAMILY

## 2025-07-15 SDOH — SOCIAL STABILITY: SOCIAL INSECURITY: ARE YOU OR HAVE YOU BEEN THREATENED OR ABUSED PHYSICALLY, EMOTIONALLY, OR SEXUALLY BY ANYONE?: NO

## 2025-07-15 SDOH — HEALTH STABILITY: MENTAL HEALTH: REASON FOR INCOMPLETE PSYCHIATRIC SCREENING: UNABLE TO ASSESS

## 2025-07-15 SDOH — SOCIAL STABILITY: SOCIAL INSECURITY: DO YOU FEEL ANYONE HAS EXPLOITED OR TAKEN ADVANTAGE OF YOU FINANCIALLY OR OF YOUR PERSONAL PROPERTY?: NO

## 2025-07-15 SDOH — ECONOMIC STABILITY: HOUSING INSECURITY: DO YOU FEEL UNSAFE GOING BACK TO THE PLACE WHERE YOU ARE LIVING?: NO

## 2025-07-15 SDOH — SOCIAL STABILITY: SOCIAL INSECURITY: HAVE YOU HAD THOUGHTS OF HARMING ANYONE ELSE?: NO

## 2025-07-15 SDOH — HEALTH STABILITY: MENTAL HEALTH: WERE YOU ABLE TO COMPLETE ALL THE BEHAVIORAL HEALTH SCREENINGS?: NO

## 2025-07-15 ASSESSMENT — ACTIVITIES OF DAILY LIVING (ADL)
TOILETING: INDEPENDENT
WALKS IN HOME: INDEPENDENT
GROOMING: INDEPENDENT
HEARING - LEFT EAR: FUNCTIONAL
PATIENT'S MEMORY ADEQUATE TO SAFELY COMPLETE DAILY ACTIVITIES?: YES
HEARING - RIGHT EAR: FUNCTIONAL
JUDGMENT_ADEQUATE_SAFELY_COMPLETE_DAILY_ACTIVITIES: YES
ADEQUATE_TO_COMPLETE_ADL: YES
BATHING: INDEPENDENT
FEEDING YOURSELF: INDEPENDENT
DRESSING YOURSELF: INDEPENDENT
LACK_OF_TRANSPORTATION: NO
LACK_OF_TRANSPORTATION: NO

## 2025-07-15 ASSESSMENT — PATIENT HEALTH QUESTIONNAIRE - PHQ9
1. LITTLE INTEREST OR PLEASURE IN DOING THINGS: NOT AT ALL
SUM OF ALL RESPONSES TO PHQ9 QUESTIONS 1 & 2: 0
2. FEELING DOWN, DEPRESSED OR HOPELESS: NOT AT ALL

## 2025-07-15 ASSESSMENT — PAIN SCALES - GENERAL
PAINLEVEL_OUTOF10: 0 - NO PAIN
PAINLEVEL_OUTOF10: 3
PAINLEVEL_OUTOF10: 1
PAINLEVEL_OUTOF10: 0 - NO PAIN

## 2025-07-15 ASSESSMENT — LIFESTYLE VARIABLES
AUDIT-C TOTAL SCORE: 0
SKIP TO QUESTIONS 9-10: 1

## 2025-07-15 NOTE — ANESTHESIA PREPROCEDURE EVALUATION
Patient: Nathan Jolly    Evaluation Method: In-person visit    Procedure Information    Date: 07/15/25  Procedure: Labor Consult     Pt is . Here for IOL    Relevant Problems   Anesthesia (within normal limits)      Cardiac (within normal limits)      Pulmonary (within normal limits)      Neuro   (+) Current moderate episode of major depressive disorder (Multi)   (+) Generalized anxiety disorder      GI   (+) Gastroesophageal reflux disease      /Renal (within normal limits)      Liver (within normal limits)      Endocrine   (+) Obesity in pregnancy ([facility])      Hematology (within normal limits)      Musculoskeletal (within normal limits)      HEENT   (+) Chronic sinusitis      ID (within normal limits)      Skin (within normal limits)      GYN   (+) 37 weeks gestation of pregnancy ([facility])   (+) Multigravida of advanced maternal age in first trimester ([facility])       Clinical information reviewed:   Tobacco  Allergies  Meds  Problems  Med Hx  Surg Hx   Fam Hx          NPO Detail:  NPO/Void Status  Date of Last Liquid: 07/15/25  Time of Last Liquid: 0611  Date of Last Solid: 25  Time of Last Solid: 1830    Visit Vitals  /83   Pulse (!) 115   Temp 36 °C (96.8 °F) (Temporal)   Resp 20         OB/Gyn Evaluation    Present Pregnancy    Patient is pregnant now.   Obstetric History                Physical Exam    Airway  Mallampati: II  TM distance: >3 FB  Neck ROM: full  Mouth opening: 3 or more finger widths     Cardiovascular    Dental - normal exam  Comments: No dental issues per pt     Pulmonary    Abdominal            Anesthesia Plan    History of general anesthesia?: yes  History of complications of general anesthesia?: no    ASA 2     epidural   (Plan for epidural. Epidural and general anesthesia explained. All questions answered )  The patient is not a current smoker.    Anesthetic plan and risks discussed with patient.  Use of blood products discussed with patient who consented  to blood products.    Plan discussed with CRNA.

## 2025-07-15 NOTE — PROGRESS NOTES
Intrapartum Progress Note    Assessment/Plan   Nathan Jolly is a 34 y.o.  at 37w2d. EVER: 8/3/2025, by Last Menstrual Period.     -IUP at 37.2 wks  -PROM  -cat I FHR     Discussed plan of IOL with Nathan. She and her  are working to figure out childcare/transportation issues, so she would like to defer IOL methods until they have a plan for her older children, states she will know more in the next 1-2 hours. Reviewed increasing risks of infection and PPH with prolonged ROM without labor. Nathan voiced understanding. Will re-evaluate plan in 1-2 hours.     ISABELLE Garcia-BENNY    Subjective   Nathan reports feeling only mildly uncomfortable with contractions.     Objective   Last Vitals:  Temp Pulse Resp BP MAP Pulse Ox   36.8 °C (98.2 °F) 73 16 122/74 94 100 %     Vitals Min/Max Last 24 Hours:  Temp  Min: 36 °C (96.8 °F)  Max: 36.8 °C (98.2 °F)  Pulse  Min: 73  Max: 115  Resp  Min: 16  Max: 20  BP  Min: 122/74  Max: 127/83  MAP (mmHg)  Min: 94  Max: 102    Intake/Output:  No intake or output data in the 24 hours ending 07/15/25 0843    Physical Examination:  GENERAL: Examination reveals a well developed, well nourished, gravid female in no acute distress. She is alert and cooperative.  LUNGS: unlabored breathing  FHR is 135, moderate, +accels, -decels  [place] reading:  rare contractions  NEUROLOGICAL: alert, oriented, normal speech, no focal findings or movement disorder noted  PSYCHOLOGICAL: awake and alert; oriented to person, place, and time    Chaperone Present: Yes.  Chaperone Name/Title: JOHNY Aguayo  Examination Chaperoned: Gynecological Exam    Lab Review:  Labs in chart were reviewed.

## 2025-07-15 NOTE — PROGRESS NOTES
S: Nathan reports feeling well overall, still not yet uncomfortable with contractions.   O: , moderate, +accels, -decels       [place]: irregular       CE: deferred  A: IUP at 37.2 wks      Cat I FHR      PROM  P: Discussed ACOG recommendation for IOL with PROM as well as increased risk of infection and PPH with delay in IOL/prolonged ROM. Nathan voiced understanding. She would like to defer initiating pitocin at this time and is agreeable to reassessing in 1-2 hrs.     ISABELLE Garcia-BENNY

## 2025-07-15 NOTE — PROGRESS NOTES
S: Nathan reports starting to feel more uncomfortable with contractions, requesting cervical exam.   O: , moderate, +accels, -decels       [place]: q 6 hours      CE: 2/30/-4  A: IUP at 37.2 wks      PROM      Cat I FHR  P: Reviewed recommendation for IOL and risks of delaying as detailed in previous note. At this time Nathan is requesting more time before starting pitocin. Is agreeable to starting pitocin in 4-6 hours if no change. Will want epidural prior to starting pitocin.       Will continue to monitor maternal and fetal wellbeing.     HANS Garcia

## 2025-07-15 NOTE — CARE PLAN
Problem: Vaginal Birth or  Section  Goal: Fetal and maternal status remain reassuring during the birth process  Outcome: Progressing  Goal: Tolerate CRB for IOL placement maintenance until dislodgement/removal 12hrs after placement  Outcome: Progressing  Goal: Prevention of malpresentation/labor dystocia through delivery  Outcome: Progressing  Goal: Demonstrates labor coping techniques through delivery  Outcome: Progressing  Goal: Minimal s/sx of HDP and BP<160/110  Outcome: Progressing  Goal: No s/sx of infection through recovery  Outcome: Progressing  Goal: No s/sx of hemorrhage through recovery  Outcome: Progressing     Problem: Postpartum  Goal: Experiences normal postpartum course  Outcome: Progressing  Goal: Appropriate maternal -  bonding  Outcome: Progressing  Goal: Establish and maintain infant feeding pattern for adequate nutrition  Outcome: Progressing  Goal: Incisions, wounds, or drain sites healing without S/S of infection  Outcome: Progressing  Goal: No s/sx infection  Outcome: Progressing  Goal: No s/sx of hemorrhage  Outcome: Progressing  Goal: Minimal s/sx of HDP and BP<160/110  Outcome: Progressing     Problem: Nausea/Vomiting  Goal: Adequate urine output (0.5 ml/kg/hr)  Outcome: Progressing  Goal: Free from nausea/vomiting  Outcome: Progressing  Goal: Tolerates prescribed diet  Outcome: Progressing  Goal: Weight maintenance or gain  Outcome: Progressing  Goal: Achieve/maintain normal electrolyte level  Outcome: Progressing     Problem: Infection  Goal: Fever/diaphoresis will improve to <38.0 C  Outcome: Progressing  Goal: Wound will have less exudate and warmth  Outcome: Progressing  Goal: Improvement in s/sx of infection  Outcome: Progressing     Problem: Pain - Adult  Goal: Verbalizes/displays adequate comfort level or baseline comfort level  Outcome: Progressing     Problem: Safety - Adult  Goal: Free from fall injury  Outcome: Progressing     Problem: Discharge Planning  Goal:  Discharge to home or other facility with appropriate resources  Outcome: Progressing   The patient's goals for the shift include epidural before pit    The clinical goals for the shift include pt will be safe from injury    Pt currently waiting for  to come back from picking up their other children from [place], pt declining to augment labor at this time and is hoping to wait until her  returns to start augmentation. Pt educated by this RN and BENNY Napier of risks of delaying augmentation.

## 2025-07-15 NOTE — H&P
OB Admission H&P    Assessment/Plan    Nathan Jolly is a 34 y.o.  at 37w2d, EVER: 8/3/2025, by Last Menstrual Period, who is admitted for Labor.    Plan   -Admit to L&D, consented  -T&S, CBC, and Syphilis  -Epidural at patient request  -Recheck as clinically indicated by maternal or fetal status    Fetal Status  -NST reactive, reassuring   -Presentation Cephalic based on ultrasound  -EFW 7 lbs by Leopold's Maneuver  -GBS neg      Postpartum      Pregnancy Problems (from 25 to present)       Problem Noted Diagnosed Resolved    Abnormal [place] glucose challenge test, antepartum ([facility]) 2025 by Darron Goode MD  No    Priority:  Medium       37 weeks gestation of pregnancy ([facility]) 2025 by ISABELLE Santamaria-CNM  No    Priority:  Medium       Overview Addendum 2025  8:59 AM by Darron Goode MD   Desired provider in labor: [] CNM  [] Physician  [x] Blood Products: [] Yes, accepts [] No, needs counseling  [x] Initial BMI: 37.57   [x] Prenatal Labs:   [] Cervical Cancer Screening up to date  [x] Rh status: pos  [x] Genetic Screening:  low risk  [x] NT US: (11-13 wks) WNL  [x] Baby ASA (if indicated): yes  [x] Pregnancy dated by: LMP    [x] Anatomy US: (19-20 wks) WNL  [] Federal Sterilization consent signed (if indicated):  [x] 1hr GCT at 24-28wks: pending 3hr GTT WNL  [x] Rhogam (if indicated): N?A  [] Fetal Surveillance (if indicated):  [] Tdap (27-32 wks, may be given up to 36 wks if initial window missed):   [x] RSV (32-36 wks) (Sept. to end of ):   [x] Flu Vaccine:    [x] Breastfeeding: yes  [x] Postpartum Birth control method: post placental Cu-IUD  [] GBS at 36 - 37 wks: pending  [x] 39 weeks discussion of IOL vs. Expectant management: Declines  [x] Mode of delivery ( anticipated ): spont labor         BMI 38.0-38.9,adult 2025 by ISABELLE Santamaria-BENNY  No    Priority:  Medium       Overview Signed 2025  3:32 PM by Cheryl HINOJOSA  HANS Wilson   2025  BMI 38 at NOB. SGP  BMI = 37.57 at NOB  Fetal surveillance BMI 35-39.9 at NOB:  Growth US at 30 and 36 wks  BPP or NST weekly 37 wks to delivery        Timing of delivery: 39 0/7 - 39 6/7 wks  *BMI >= 50 at any time in pregnancy: Deliver at Level 4          History of retained placenta 2025 by HANS Santamaria  No    Priority:  Medium       Overview Signed 2025  3:33 PM by HANS Santamaria   1/7/2025  X2. With hemorrhage. SGP         History of fetal anomaly in prior pregnancy, currently pregnant ([facility]) 2025 by HANS Santamaria  No    Priority:  Medium       Overview Addendum 2025  8:35 AM by HANS Santamaria   :  D&E at 17 weeks for fetal Pentalogy of [place]ra   Normal whole genome sequencing  <1% risk of recurrence    2025  Hs seen MFM. Plan per M 16 week US, 20 week anatomy, and then 30, 36 growth with weekly testing at 37 weeks and possible IOL in the 39th week. SGP         Multigravida of advanced maternal age in first trimester ([facility]) 3/3/2024 by HANS Feliciano  No    Priority:  Medium       Overview Signed 2025  3:33 PM by HANS Santamaria   2025  NIPT ordered. SGP                 Subjective   Good fetal movement.  Denies vaginal bleeding., C/O of occasional contractions., Huge gush at 0400.    Prenatal Provider Dr. Goode    OB History    Para Term  AB Living   5 3 3 0 1 3   SAB IAB Ectopic Multiple Live Births   0 1 0 0 3      # Outcome Date GA Lbr Bradford/2nd Weight Sex Type Anes PTL Lv   5 Current            4 IAB 2024 17w0d             Complications: Maternal care for other (suspected) fetal abnormality and damage, fetal cardiac anomalies, fetus 1   3 Term 18 40w0d  3.232 kg F Vag-Spont EPI N LIN      Name: Evy   2 Term 11/27/15 40w0d  3.232 kg M Vag-Spont EPI N LIN      Complications: Placenta, retained  ([facility])      Name: Dawson Gómez Term 03/01/13 39w0d  3.232 kg M Vag-Spont EPI N LIN      Complications: Placenta, retained ([facility])      Name: Fe       Surgical History[1]    Social History     Tobacco Use    Smoking status: Never    Smokeless tobacco: Never   Substance Use Topics    Alcohol use: Never       Allergies[2]    Prescriptions Prior to Admission[3]  Objective     Last Vitals  Temp Pulse Resp BP MAP O2 Sat   36 °C (96.8 °F) (!) 115 20 127/83 102 98 %     Blood Pressures         7/15/2025  0528 7/15/2025  0530          BP: 127/83 127/83               Physical Exam  General: NAD, mood appropriate  Cardiopulmonary: warm and well perfused, breathing comfortably on room air  Abdomen: Gravid, non-tender  Extremities: Symmetric  Speculum Exam: Grossly ruptured  Cervix: 2 /30 /-3     Chaperone Present: Yes.     Fetal Monitoring  Baseline: 140 bpm, Variability: moderate,  Accelerations: present and Decelerations: none  Uterine Activity: Irregular contractions  Interpretation: Reactive    Bedside ultrasound: Yes    Labs in chart were reviewed.          Prenatal labs reviewed, not remarkable.             [1]   Past Surgical History:  Procedure Laterality Date    DILATION AND CURETTAGE OF UTERUS  12/21/2015    Dilation And Curettage    DILATION AND EVACUATION  2024   [2] No Known Allergies  [3]   Medications Prior to Admission   Medication Sig Dispense Refill Last Dose/Taking    aspirin 81 mg EC tablet Take 1 tablet (81 mg) by mouth once daily.   7/14/2025 at 11:00 PM    magnesium oxide (Mag-Ox) 400 mg (241.3 mg magnesium) tablet Take 1 tablet (400 mg) by mouth once daily. 30 tablet 11 7/14/2025 at 11:00 PM    -iron fum-folic acid (Prenatal 19) 29 mg iron- 1 mg tablet Take by mouth.   7/14/2025 at 11:00 PM

## 2025-07-16 PROCEDURE — 59409 OBSTETRICAL CARE: CPT | Performed by: ADVANCED PRACTICE MIDWIFE

## 2025-07-16 PROCEDURE — 7210000002 HC LABOR PER HOUR

## 2025-07-16 PROCEDURE — 2500000004 HC RX 250 GENERAL PHARMACY W/ HCPCS (ALT 636 FOR OP/ED): Performed by: NURSE ANESTHETIST, CERTIFIED REGISTERED

## 2025-07-16 PROCEDURE — 51701 INSERT BLADDER CATHETER: CPT

## 2025-07-16 PROCEDURE — 59410 OBSTETRICAL CARE: CPT | Performed by: ADVANCED PRACTICE MIDWIFE

## 2025-07-16 PROCEDURE — 58300 INSERT INTRAUTERINE DEVICE: CPT | Performed by: ADVANCED PRACTICE MIDWIFE

## 2025-07-16 PROCEDURE — 1100000001 HC PRIVATE ROOM DAILY

## 2025-07-16 PROCEDURE — 7100000016 HC LABOR RECOVERY PER HOUR

## 2025-07-16 PROCEDURE — 2500000004 HC RX 250 GENERAL PHARMACY W/ HCPCS (ALT 636 FOR OP/ED): Performed by: ADVANCED PRACTICE MIDWIFE

## 2025-07-16 PROCEDURE — 2500000001 HC RX 250 WO HCPCS SELF ADMINISTERED DRUGS (ALT 637 FOR MEDICARE OP): Performed by: OBSTETRICS & GYNECOLOGY

## 2025-07-16 PROCEDURE — 59050 FETAL MONITOR W/REPORT: CPT

## 2025-07-16 PROCEDURE — 0UH97HZ INSERTION OF CONTRACEPTIVE DEVICE INTO UTERUS, VIA NATURAL OR ARTIFICIAL OPENING: ICD-10-PCS | Performed by: ADVANCED PRACTICE MIDWIFE

## 2025-07-16 RX ORDER — POLYETHYLENE GLYCOL 3350 17 G/17G
17 POWDER, FOR SOLUTION ORAL 2 TIMES DAILY PRN
Status: DISCONTINUED | OUTPATIENT
Start: 2025-07-16 | End: 2025-07-17 | Stop reason: HOSPADM

## 2025-07-16 RX ORDER — IBUPROFEN 600 MG/1
600 TABLET, FILM COATED ORAL EVERY 6 HOURS
Status: DISCONTINUED | OUTPATIENT
Start: 2025-07-16 | End: 2025-07-17 | Stop reason: HOSPADM

## 2025-07-16 RX ORDER — LOPERAMIDE HYDROCHLORIDE 2 MG/1
4 CAPSULE ORAL EVERY 2 HOUR PRN
Status: DISCONTINUED | OUTPATIENT
Start: 2025-07-16 | End: 2025-07-17 | Stop reason: HOSPADM

## 2025-07-16 RX ORDER — CARBOPROST TROMETHAMINE 250 UG/ML
250 INJECTION, SOLUTION INTRAMUSCULAR ONCE AS NEEDED
Status: DISCONTINUED | OUTPATIENT
Start: 2025-07-16 | End: 2025-07-17 | Stop reason: HOSPADM

## 2025-07-16 RX ORDER — LABETALOL HYDROCHLORIDE 5 MG/ML
20 INJECTION, SOLUTION INTRAVENOUS ONCE AS NEEDED
Status: DISCONTINUED | OUTPATIENT
Start: 2025-07-16 | End: 2025-07-17 | Stop reason: HOSPADM

## 2025-07-16 RX ORDER — DIPHENHYDRAMINE HCL 25 MG
25 CAPSULE ORAL EVERY 6 HOURS PRN
Status: DISCONTINUED | OUTPATIENT
Start: 2025-07-16 | End: 2025-07-17 | Stop reason: HOSPADM

## 2025-07-16 RX ORDER — METHYLERGONOVINE MALEATE 0.2 MG/ML
0.2 INJECTION INTRAVENOUS ONCE AS NEEDED
Status: DISCONTINUED | OUTPATIENT
Start: 2025-07-16 | End: 2025-07-17 | Stop reason: HOSPADM

## 2025-07-16 RX ORDER — MISOPROSTOL 200 UG/1
800 TABLET ORAL ONCE AS NEEDED
Status: DISCONTINUED | OUTPATIENT
Start: 2025-07-16 | End: 2025-07-17 | Stop reason: HOSPADM

## 2025-07-16 RX ORDER — BUPIVACAINE HYDROCHLORIDE 2.5 MG/ML
INJECTION, SOLUTION EPIDURAL; INFILTRATION; INTRACAUDAL; PERINEURAL AS NEEDED
Status: DISCONTINUED | OUTPATIENT
Start: 2025-07-16 | End: 2025-07-16

## 2025-07-16 RX ORDER — HYDRALAZINE HYDROCHLORIDE 20 MG/ML
5 INJECTION INTRAMUSCULAR; INTRAVENOUS ONCE AS NEEDED
Status: DISCONTINUED | OUTPATIENT
Start: 2025-07-16 | End: 2025-07-17 | Stop reason: HOSPADM

## 2025-07-16 RX ORDER — FAMOTIDINE 10 MG/ML
20 INJECTION, SOLUTION INTRAVENOUS EVERY 12 HOURS SCHEDULED
Status: DISCONTINUED | OUTPATIENT
Start: 2025-07-16 | End: 2025-07-16

## 2025-07-16 RX ORDER — SIMETHICONE 80 MG
80 TABLET,CHEWABLE ORAL 4 TIMES DAILY PRN
Status: DISCONTINUED | OUTPATIENT
Start: 2025-07-16 | End: 2025-07-17 | Stop reason: HOSPADM

## 2025-07-16 RX ORDER — TRANEXAMIC ACID 1 G/10ML
1000 INJECTION, SOLUTION INTRAVENOUS ONCE AS NEEDED
Status: DISCONTINUED | OUTPATIENT
Start: 2025-07-16 | End: 2025-07-17 | Stop reason: HOSPADM

## 2025-07-16 RX ORDER — ADHESIVE BANDAGE
10 BANDAGE TOPICAL
Status: DISCONTINUED | OUTPATIENT
Start: 2025-07-16 | End: 2025-07-17 | Stop reason: HOSPADM

## 2025-07-16 RX ORDER — OXYTOCIN 10 [USP'U]/ML
10 INJECTION, SOLUTION INTRAMUSCULAR; INTRAVENOUS ONCE AS NEEDED
Status: DISCONTINUED | OUTPATIENT
Start: 2025-07-16 | End: 2025-07-17 | Stop reason: HOSPADM

## 2025-07-16 RX ORDER — ACETAMINOPHEN 325 MG/1
975 TABLET ORAL EVERY 6 HOURS
Status: DISCONTINUED | OUTPATIENT
Start: 2025-07-16 | End: 2025-07-17 | Stop reason: HOSPADM

## 2025-07-16 RX ORDER — ONDANSETRON HYDROCHLORIDE 2 MG/ML
4 INJECTION, SOLUTION INTRAVENOUS EVERY 6 HOURS PRN
Status: DISCONTINUED | OUTPATIENT
Start: 2025-07-16 | End: 2025-07-17 | Stop reason: HOSPADM

## 2025-07-16 RX ORDER — DIPHENHYDRAMINE HYDROCHLORIDE 50 MG/ML
25 INJECTION, SOLUTION INTRAMUSCULAR; INTRAVENOUS EVERY 6 HOURS PRN
Status: DISCONTINUED | OUTPATIENT
Start: 2025-07-16 | End: 2025-07-17 | Stop reason: HOSPADM

## 2025-07-16 RX ORDER — OXYTOCIN/0.9 % SODIUM CHLORIDE 30/500 ML
60 PLASTIC BAG, INJECTION (ML) INTRAVENOUS ONCE AS NEEDED
Status: DISCONTINUED | OUTPATIENT
Start: 2025-07-16 | End: 2025-07-17 | Stop reason: HOSPADM

## 2025-07-16 RX ORDER — ONDANSETRON 4 MG/1
4 TABLET, FILM COATED ORAL EVERY 6 HOURS PRN
Status: DISCONTINUED | OUTPATIENT
Start: 2025-07-16 | End: 2025-07-17 | Stop reason: HOSPADM

## 2025-07-16 RX ADMIN — ACETAMINOPHEN 975 MG: 325 TABLET ORAL at 17:10

## 2025-07-16 RX ADMIN — ACETAMINOPHEN 975 MG: 325 TABLET ORAL at 23:38

## 2025-07-16 RX ADMIN — COPPER 1 EACH: 313.4 INTRAUTERINE DEVICE INTRAUTERINE at 07:13

## 2025-07-16 RX ADMIN — IBUPROFEN 600 MG: 600 TABLET ORAL at 17:10

## 2025-07-16 RX ADMIN — IBUPROFEN 600 MG: 600 TABLET ORAL at 23:38

## 2025-07-16 RX ADMIN — FAMOTIDINE 20 MG: 10 INJECTION INTRAVENOUS at 05:51

## 2025-07-16 RX ADMIN — BUPIVACAINE HYDROCHLORIDE 5 ML: 2.5 INJECTION, SOLUTION EPIDURAL; INFILTRATION; INTRACAUDAL; PERINEURAL at 05:55

## 2025-07-16 ASSESSMENT — PAIN SCALES - GENERAL
PAINLEVEL_OUTOF10: 0 - NO PAIN
PAIN_LEVEL: 1
PAINLEVEL_OUTOF10: 0 - NO PAIN
PAINLEVEL_OUTOF10: 4
PAINLEVEL_OUTOF10: 0 - NO PAIN

## 2025-07-16 ASSESSMENT — PAIN - FUNCTIONAL ASSESSMENT
PAIN_FUNCTIONAL_ASSESSMENT: 0-10
PAIN_FUNCTIONAL_ASSESSMENT: 0-10

## 2025-07-16 NOTE — PROGRESS NOTES
S: Nathan reports feeling clammy, nauseated, and like she would faint with the last two contractions. Also endorsing some vaginal pressure with contractions and is interested in an exam.   O: , moderate, +accels, -decels       [place]: q 4-5 mins       Pitocin: 2 mu/min       CE: /-3       Epidural: infusing   A: IUP at 36.2 wks      Cat I FHR       PROM--> IOL  P: cEFM.       Continue pitocin per guidelines.        Zofran given for nausea.        Discussed limiting cervical exams given prolonged ROM.        Reassess in 2-4 hrs or sooner prn.        Anticipate .     MAYCO Napier, ISABELLE-BENNY

## 2025-07-16 NOTE — ANESTHESIA POSTPROCEDURE EVALUATION
Patient: Nathan Jolly    Procedure Summary       Date: 07/15/25 Room / Location:     Anesthesia Start: 2131 Anesthesia Stop: 07/16/25 0705    Procedure: Labor Analgesia Diagnosis:     Scheduled Providers:  Responsible Provider: JESSICA Moss    Anesthesia Type: epidural ASA Status: 2            Anesthesia Type: epidural    Vitals Value Taken Time   BP See Flowsheet 07/16/25 14:37   Temp  07/16/25 14:37   Pulse  07/16/25 14:37   Resp  07/16/25 14:37   SpO2  07/16/25 14:37       Anesthesia Post Evaluation    Patient location during evaluation: bedside  Patient participation: complete - patient participated  Level of consciousness: awake and alert  Pain score: 1  Pain management: adequate  Airway patency: patent  Cardiovascular status: acceptable  Respiratory status: acceptable  Hydration status: acceptable  Postoperative Nausea and Vomiting: none  Comments: Patient doing well overall.  Still has some tingling in legs.  Has been up walking and to the restroom without issues.  Complains of small headache but attributes it to being awake for two days.  Has been eating and denies any N/V.  Patient does have a sore back from epidural placement.  There is a small bruise noted but no redness, swelling or hematoma.  Anesthesia will sign-off.        No notable events documented.

## 2025-07-16 NOTE — ANESTHESIA PROCEDURE NOTES
Epidural Block    Patient location during procedure: OB  Start time: 7/15/2025 9:36 PM  End time: 7/15/2025 9:50 PM  Reason for block: labor analgesia  Staffing  Performed: CRNA   Authorized by: JESSICA Simeon    Performed by: JESSICA Simeon    Preanesthetic Checklist  Completed: patient identified, IV checked, risks and benefits discussed, surgical consent, pre-op evaluation, timeout performed and sterile techniques followed  Block Timeout  RN/Licensed healthcare professional reads aloud to the Anesthesia provider and entire team: Patient identity, procedure with side and site, patient position, and as applicable the availability of implants/special equipment/special requirements.    Timeout performed at: 7/15/2025 9:36 AM  Block Placement  Patient position: sitting  Prep: ChloraPrep  Sterility prep: cap, drape, gloves, hand and mask  Sedation level: no sedation  Patient monitoring: continuous pulse oximetry and blood pressure  Approach: midline  Local numbing: lidocaine 1% to skin and subcutaneous tissues  Epidural  Loss of resistance technique: saline  Guidance: landmark technique        Needle  Needle type: Tuohy   Needle gauge: 17  Needle length: 8.9cm  Needle insertion depth: 6 cm  Catheter type: multi-orifice  Catheter size: 19 G  Catheter at skin depth: 11 cm  Catheter securement method: clear occlusive dressing and surgical tape    Test dose: lidocaine 1.5% with epinephrine 1-to-200,000  Test dose: lidocaine 1.5% with epinephrine 1-to-200,000  Test dose result: no positive test dose    PCEA  Medication concentration used: 0.2% Ropivacaine with 2 mcg/mL Fentanyl  Dose (mL): 5  Lockout (minutes): 30  1-Hour Limit (boluses/hr): 25  Basal Rate: 8        Assessment  Sensory level: T10 bilateral  Block outcome: patient comfortable  Number of attempts: 1  Events: no positive test dose  Procedure assessment: patient tolerated procedure well with no immediate complications

## 2025-07-16 NOTE — PROGRESS NOTES
Late entry for   Nathan states she would like epidural placement around  as her  is now in town. She will then be agreeable to pitocin once she is settled in with epidural.     , moderate, +accels, -decels  [place]: irregular  CE: deferred    cEFM.   Anesthesia to the bedside for epidural placement.   Will start pitocin after epidural.   Anticipate .     HANS Garcia

## 2025-07-16 NOTE — CARE PLAN
The patient's goals for the shift include epidural before pit    The clinical goals for the shift include pt will be safe from injury

## 2025-07-16 NOTE — CARE PLAN
The clinical goals for the shift include Vaginal bleeding WNL    VS and assessment stable. Patient ambulating in room well and is latching/ bonding with  well. Patient's pain is being controlled with medication, rest, and heat packs.

## 2025-07-16 NOTE — L&D DELIVERY NOTE
Vaginal Delivery Note    Patient Name: Nathan Jolly  : 1990  MRN: [id number]  Age: 34 y.o.    /Para:   Gestational Age: 37w3d    Date of Delivery: 2025    Procedure: Normal Spontaneous Vaginal Delivery    Delivery Provider: fabiana wisdom    Resident/Fellow/Other Assistant: n/a    Description of Procedure:  Delivery of viable infant under epidural anesthesia. Delayed clamping was performed. The infant was placed skin to skin. Cord gases were not sent.  Cord blood was collected. Placenta delivered intact and fundus was firm    no Laceration was identified    Additional Procedures:  Paragard placement    Findings:   Amniotic fluid Clear, Male infant in Vertex Occiput Anterior presentation, APGARS 9 , 9 .  Birth Weight 3.125 kg.    Complications: None    Quantitative Blood Loss:   Delivery QBL: 63 mL (2025  7:05 AM - 2025 10:16 AM)    Blood products:      Uterotonics/Hemostatic Agent: IV Pitocin 30 units    Specimen:   Placenta  Delivered: 2025  7:16 AM  Appearance: Intact  Removal: Spontaneous    Disposition: discarded    Sponge/Instrument/Needle Counts: The sponge, lap and needle counts were correct.    Patient Disposition: Patient recovering on labor and delivery in stable condition.    Fabiola Jolly [[id number]]      Labor Events    Rupture date/time: 7/15/2025 0430  Rupture type: Spontaneous  Fluid color: Clear  Labor type: Spontaneous Onset of Labor  Labor allowed to proceed with plans for an attempted vaginal birth?: Yes  Augmentation: Oxytocin  Complications: None       Labor Event Times    Labor onset date/time: 7/15/2025 0511  Dilation complete date/time: 2025 0642  Start pushing date/time: 2025 06:57       Labor Length    1st stage: 25h 31m  2nd stage: 0h 23m  3rd stage: 0h 11m       Placenta    Placenta delivery date/time: 2025 07:16  Placenta removal: Spontaneous  Placenta appearance: Intact  Placenta disposition: discarded       Cord    Vessels: 3  vessels  Complications: None  Delayed cord clamping?: Yes  Cord clamped date/time: 2025 07:06:00  Cord blood disposition: Lab  Gases sent?: No  Stem cell collection (by provider): No       Lacerations    Perineal laceration: None  Other lacerations?: No       Anesthesia    Method: Epidural       Operative Delivery    Forceps attempted?: No  Vacuum extractor attempted?: No       Shoulder Dystocia    Shoulder dystocia present?: No        Delivery    Time head delivered: 2025 07:05:00  Birth date/time: 2025 07:05:00  Delivery type: Vaginal, Spontaneous  Complications: None       Resuscitation    Method: Suctioning, Tactile stimulation       Apgars    Living status: Living  Apgar Component Scores:  1 min.:  5 min.:  10 min.:  15 min.:  20 min.:    Skin color:  1  1       Heart rate:  2  2       Reflex irritability:  2  2       Muscle tone:  2  2       Respiratory effort:  2  2       Total:  9  9       Apgars assigned by: JASON RODRIGUEZ       Delivery Providers    Delivering clinician: ISABELLE Connors-BENNY   Provider Role    Marie Li RN Delivery Nurse    Zonia Rodriguez, RN Nursery Nurse    Ai Stafford RN Delivery Nurse

## 2025-07-16 NOTE — PROGRESS NOTES
S: Nathan reports feeling vaginal and rectal pressure, requesting cervical exam.   O: , moderate, +accels, +variable decelerations       [place]: q 2-3 mins        CE: 10/100/0       Pitocin: 10 mu/min  A: IUP at 37.3 wks      PROM--> IOL--> 2nd stage      Cat II FHR  P: Will begin maternal pushing efforts.       Anticipate .     HANS Garcia

## 2025-07-16 NOTE — ANESTHESIA PREPROCEDURE EVALUATION
Patient: Nathan Jolly    Evaluation Method: In-person visit    Procedure Information    Date: 07/15/25  Procedure: Labor Consult     Pt is . Here for IOL    Relevant Problems   Anesthesia (within normal limits)      Cardiac (within normal limits)      Pulmonary (within normal limits)      Neuro   (+) Current moderate episode of major depressive disorder (Multi)   (+) Generalized anxiety disorder      GI   (+) Gastroesophageal reflux disease      /Renal (within normal limits)      Liver (within normal limits)      Endocrine   (+) Obesity in pregnancy ([facility])      Hematology (within normal limits)      Musculoskeletal (within normal limits)      HEENT   (+) Chronic sinusitis      ID (within normal limits)      Skin (within normal limits)      GYN   (+) 37 weeks gestation of pregnancy ([facility])   (+) Multigravida of advanced maternal age in first trimester ([facility])       Clinical information reviewed:   Tobacco  Allergies  Meds  Problems  Med Hx  Surg Hx   Fam Hx          NPO Detail:  NPO/Void Status  Date of Last Liquid: 07/15/25  Time of Last Liquid: 0611  Date of Last Solid: 25  Time of Last Solid: 1830    Visit Vitals  /62   Pulse 91   Temp 36.6 °C (97.9 °F) (Temporal)   Resp 16         OB/Gyn Evaluation    Present Pregnancy    Patient is pregnant now.   Obstetric History                Physical Exam    Airway  Mallampati: II  TM distance: >3 FB  Neck ROM: full  Mouth opening: 3 or more finger widths     Cardiovascular - normal exam   Dental - normal exam  Comments: No dental issues per pt     Pulmonary - normal exam   Abdominal - normal exam  Comments: gravid           Anesthesia Plan    History of general anesthesia?: yes  History of complications of general anesthesia?: no    ASA 2     epidural   (Plan for epidural. Epidural and general anesthesia explained. All questions answered )  The patient is not a current smoker.    Anesthetic plan and risks discussed with patient.  Use of  blood products discussed with patient who consented to blood products.    Plan discussed with CRNA.

## 2025-07-16 NOTE — PROGRESS NOTES
S: Nathan reports being mostly comfortable with epidural, declines cervical exam.   O: , moderate, +accels, -decels       [place]: q 3-5 mins       Pitocin: 6 mu/min       CE: deferred  A: IUP at 37.3 wks      PROM--> IOL--> early labor      Cat I FHR  P: cEFM.         Continue pitocin per guidelines.       Reassess in 2-4 hrs or sooner prn.       Anticipate .     MAYCO Napier, ISABELLE-BENNY

## 2025-07-17 ENCOUNTER — APPOINTMENT (OUTPATIENT)
Dept: OBSTETRICS AND GYNECOLOGY | Facility: CLINIC | Age: 35
End: 2025-07-17
Payer: COMMERCIAL

## 2025-07-17 VITALS
OXYGEN SATURATION: 97 % | HEIGHT: 64 IN | BODY MASS INDEX: 38.73 KG/M2 | DIASTOLIC BLOOD PRESSURE: 71 MMHG | TEMPERATURE: 98.6 F | RESPIRATION RATE: 16 BRPM | WEIGHT: 226.85 LBS | HEART RATE: 63 BPM | SYSTOLIC BLOOD PRESSURE: 107 MMHG

## 2025-07-17 LAB
BLOOD EXPIRATION DATE: NORMAL
BLOOD EXPIRATION DATE: NORMAL
DISPENSE STATUS: NORMAL
DISPENSE STATUS: NORMAL
PRODUCT BLOOD TYPE: 5100
PRODUCT BLOOD TYPE: 6200
PRODUCT CODE: NORMAL
PRODUCT CODE: NORMAL
UNIT ABO: NORMAL
UNIT ABO: NORMAL
UNIT NUMBER: NORMAL
UNIT NUMBER: NORMAL
UNIT RH: NORMAL
UNIT RH: NORMAL
UNIT VOLUME: 350
UNIT VOLUME: 350
XM INTEP: NORMAL
XM INTEP: NORMAL

## 2025-07-17 PROCEDURE — 2500000001 HC RX 250 WO HCPCS SELF ADMINISTERED DRUGS (ALT 637 FOR MEDICARE OP): Performed by: OBSTETRICS & GYNECOLOGY

## 2025-07-17 RX ORDER — ACETAMINOPHEN 325 MG/1
975 TABLET ORAL EVERY 6 HOURS
Qty: 168 TABLET | Refills: 0 | Status: SHIPPED | OUTPATIENT
Start: 2025-07-17 | End: 2025-07-31

## 2025-07-17 RX ORDER — IBUPROFEN 600 MG/1
600 TABLET, FILM COATED ORAL EVERY 6 HOURS
Qty: 56 TABLET | Refills: 0 | Status: SHIPPED | OUTPATIENT
Start: 2025-07-17 | End: 2025-07-31

## 2025-07-17 RX ADMIN — IBUPROFEN 600 MG: 600 TABLET ORAL at 05:19

## 2025-07-17 RX ADMIN — ACETAMINOPHEN 975 MG: 325 TABLET ORAL at 12:00

## 2025-07-17 RX ADMIN — IBUPROFEN 600 MG: 600 TABLET ORAL at 12:00

## 2025-07-17 RX ADMIN — ACETAMINOPHEN 975 MG: 325 TABLET ORAL at 05:19

## 2025-07-17 ASSESSMENT — PAIN SCALES - GENERAL: PAINLEVEL_OUTOF10: 0 - NO PAIN

## 2025-07-17 NOTE — CARE PLAN
The patient's goals for the shift include discharge    The clinical goals for the shift include free from injury    Pt educated on post birth maternal warning signs and when to notify OB/call 911.      Problem: Postpartum  Goal: Experiences normal postpartum course  Outcome: Adequate for Discharge  Goal: Appropriate maternal -  bonding  Outcome: Adequate for Discharge  Goal: Establish and maintain infant feeding pattern for adequate nutrition  Outcome: Adequate for Discharge  Goal: Incisions, wounds, or drain sites healing without S/S of infection  Outcome: Adequate for Discharge  Goal: No s/sx infection  Outcome: Adequate for Discharge  Goal: No s/sx of hemorrhage  Outcome: Adequate for Discharge  Goal: Minimal s/sx of HDP and BP<160/110  Outcome: Adequate for Discharge     Problem: Pain - Adult  Goal: Verbalizes/displays adequate comfort level or baseline comfort level  Outcome: Met     Problem: Safety - Adult  Goal: Free from fall injury  Outcome: Met     Problem: Discharge Planning  Goal: Discharge to home or other facility with appropriate resources  Outcome: Met

## 2025-07-17 NOTE — CARE PLAN
Problem: Postpartum  Goal: Experiences normal postpartum course  Outcome: Progressing  Goal: Appropriate maternal -  bonding  Outcome: Progressing  Goal: Establish and maintain infant feeding pattern for adequate nutrition  Outcome: Progressing  Goal: Incisions, wounds, or drain sites healing without S/S of infection  Outcome: Progressing  Goal: No s/sx infection  Outcome: Progressing  Goal: No s/sx of hemorrhage  Outcome: Progressing  Goal: Minimal s/sx of HDP and BP<160/110  Outcome: Progressing     Problem: Safety - Adult  Goal: Free from fall injury  Outcome: Progressing     Problem: Discharge Planning  Goal: Discharge to home or other facility with appropriate resources  Outcome: Progressing     The patient's goals for the shift include bond with infant, get some rest     The clinical goals for the shift include VSS, assessments WNL, continue to meet postpartum milestones    Over the shift, the patient did make progress toward the following goals.

## 2025-07-17 NOTE — DISCHARGE SUMMARY
Discharge Summary    Admission Date: 7/15/2025  Discharge Date: 2025    Discharge Diagnosis  37 weeks gestation of pregnancy ([facility])   (spontaneous vaginal delivery) ([facility])    Hospital Course  Delivery Date: 2025 7:05 AM  Delivery type: Vaginal, Spontaneous   GA at delivery: 37w3d  Outcome: Living  Anesthesia during delivery: Epidural  Intrapartum complications: None  Feeding method: Breastfeeding Status: Yes     Procedures: IUD insertion postplacental Paragard placement   Contraception at discharge: IUD, postplacental Paragard placement       Pertinent Subjective and Physical Exam At Time of Discharge  Patient seen at bedside, she is doing well with no acute events overnight. She appropriately met postpartum milestones. Pain well-controlled on current regimen, lochia light-moderate w/o clots today (passed 1 small clot yesterday), voiding spontaneously, passing flatus, has not had a bowel movement yet., ambulating independently w/o difficulty, and tolerating PO. She is breast feeding without difficulty, experienced with breastfeeding. She denies chest pain, palpitations, cough, shortness of breath, headaches, scotoma/vision changes, pain under right breast, fever, chills, heavy/uncontrolled vaginal bleeding, abdominal pain, dizziness, fatigue. She denies any breast concerns or emotional concerns at this time.    I have reviewed with the patient the standard 2 day stay s/p  and the risks/benefits of early DC. I recommended the patient stay for 2 days. Patient verbalized understanding and desires DC today. She endorses feeling well and states she is ready to go home.       PHYSICAL EXAM:    General: Examination reveals a well developed, well nourished, female, in no acute distress. She is alert and cooperative.  Cardiac: warm, well-perfused, heart rate regular   Respiratory: symmetrical, non-labored breathing, lungs CTAB  Breast: Normal nipple areolar complex, no masses, or nipple discharge    Abdomen: soft, non-tender, non-distended, bowel sounds active  Fundus: midline, firm, below umbilicus -1, light-moderate lochia rubra  Perineum: deferred,   Extremities: no redness or tenderness in the calves or thighs. Edema: non-pitting BLE  Neurological: alert, oriented, normal speech, no focal findings or movement disorder noted.  Skin: no rashes or lesions  Psychological: Appropriate mood and affect, awake and alert; oriented to person, place, and time.      Last Vitals:  Temp Pulse Resp BP MAP Pulse Ox   37 °C (98.6 °F) 63 16 107/71 97 97 %     Discharge Meds     Your medication list        START taking these medications        Instructions Last Dose Given Next Dose Due   acetaminophen 325 mg tablet  Commonly known as: Tylenol      Take 3 tablets (975 mg) by mouth every 6 hours for 14 days.       ibuprofen 600 mg tablet      Take 1 tablet (600 mg) by mouth every 6 hours for 14 days.              CONTINUE taking these medications        Instructions Last Dose Given Next Dose Due   Prenatal 19 29 mg iron- 1 mg tablet  Generic drug: -iron fum-folic acid                  STOP taking these medications      aspirin 81 mg EC tablet        magnesium oxide 400 mg (241.3 mg elemental) tablet  Commonly known as: Mag-Ox                  Where to Get Your Medications        These medications were sent to [facility]  [address], [place] [address]      Hours: 8 AM to 6 PM Mon-Fri, 9 AM to 1 PM Saturday Phone: [phone]   acetaminophen 325 mg tablet  ibuprofen 600 mg tablet          Complications Requiring Follow-Up      Test Results Pending At Discharge  Pending Labs       No current pending labs.            Outpatient Follow-Up  No future appointments.  - Follow-up in 2wks for IUD string check   - Follow-up in 4-6wks with primary OBGYN      I spent >30 minutes in the professional and overall care of this patient.      ISABELLE Vidal-CNP      I was present with the APRN and  have personally seen and examined the patient and have reviewed the APRN documentation and verified the findings in the note.    Riki So, ISABELLE-CNM

## 2025-07-18 ENCOUNTER — APPOINTMENT (OUTPATIENT)
Dept: OBSTETRICS AND GYNECOLOGY | Facility: CLINIC | Age: 35
End: 2025-07-18
Payer: COMMERCIAL

## 2025-07-24 ENCOUNTER — APPOINTMENT (OUTPATIENT)
Dept: OBSTETRICS AND GYNECOLOGY | Facility: CLINIC | Age: 35
End: 2025-07-24
Payer: COMMERCIAL

## 2025-07-25 ENCOUNTER — POSTPARTUM VISIT (OUTPATIENT)
Dept: OBSTETRICS AND GYNECOLOGY | Facility: CLINIC | Age: 35
End: 2025-07-25
Payer: COMMERCIAL

## 2025-07-25 VITALS
HEIGHT: 64 IN | DIASTOLIC BLOOD PRESSURE: 70 MMHG | WEIGHT: 202.8 LBS | BODY MASS INDEX: 34.62 KG/M2 | SYSTOLIC BLOOD PRESSURE: 102 MMHG

## 2025-07-25 DIAGNOSIS — Z30.431 IUD CHECK UP: Primary | ICD-10-CM

## 2025-07-25 RX ORDER — MUPIROCIN 20 MG/G
1 OINTMENT TOPICAL
COMMUNITY
Start: 2025-07-21

## 2025-07-25 RX ORDER — TRIAMCINOLONE ACETONIDE 1 MG/G
1 OINTMENT TOPICAL 2 TIMES DAILY
COMMUNITY
Start: 2025-07-21

## 2025-07-25 ASSESSMENT — PAIN SCALES - GENERAL: PAINLEVEL_OUTOF10: 0-NO PAIN

## 2025-07-25 NOTE — PROGRESS NOTES
"Assessment/Plan   Problem List Items Addressed This Visit    None      Cheryl Wilson, ISABELLE-JOSE AM    Subjective   Nathan Jolly is a 34 y.o.  who presents for IUD check.    Type of IUD:  ParaGard  Date of insertion:  25  Other relevant history/information:  none    Objective   /70   Ht 1.626 m (5' 4\")   Wt 92 kg (202 lb 12.8 oz)   LMP 10/27/2024   Breastfeeding Yes   BMI 34.81 kg/m²     OBGyn Exam    Iud strings noted from the cervical os.   She will need   "

## 2025-08-07 ENCOUNTER — APPOINTMENT (OUTPATIENT)
Dept: OBSTETRICS AND GYNECOLOGY | Facility: CLINIC | Age: 35
End: 2025-08-07
Payer: COMMERCIAL

## 2025-08-28 ENCOUNTER — APPOINTMENT (OUTPATIENT)
Dept: OBSTETRICS AND GYNECOLOGY | Facility: CLINIC | Age: 35
End: 2025-08-28
Payer: COMMERCIAL

## 2025-08-28 VITALS
HEIGHT: 64 IN | SYSTOLIC BLOOD PRESSURE: 122 MMHG | DIASTOLIC BLOOD PRESSURE: 60 MMHG | WEIGHT: 211 LBS | BODY MASS INDEX: 36.02 KG/M2

## 2025-08-28 PROBLEM — Z3A.37 37 WEEKS GESTATION OF PREGNANCY (HHS-HCC): Status: RESOLVED | Noted: 2025-01-07 | Resolved: 2025-08-28

## 2025-08-28 PROBLEM — O09.521 MULTIGRAVIDA OF ADVANCED MATERNAL AGE IN FIRST TRIMESTER (HHS-HCC): Status: RESOLVED | Noted: 2024-03-03 | Resolved: 2025-08-28

## 2025-08-28 PROBLEM — O99.810 ABNORMAL O'SULLIVAN GLUCOSE CHALLENGE TEST, ANTEPARTUM (HHS-HCC): Status: RESOLVED | Noted: 2025-05-06 | Resolved: 2025-08-28

## 2025-08-28 PROBLEM — Z87.59 HISTORY OF RETAINED PLACENTA: Status: RESOLVED | Noted: 2025-01-07 | Resolved: 2025-08-28

## 2025-08-28 PROBLEM — O09.299 HISTORY OF FETAL ANOMALY IN PRIOR PREGNANCY, CURRENTLY PREGNANT (HHS-HCC): Status: RESOLVED | Noted: 2025-01-07 | Resolved: 2025-08-28

## 2025-08-28 PROCEDURE — 0503F POSTPARTUM CARE VISIT: CPT | Performed by: ADVANCED PRACTICE MIDWIFE

## 2025-08-28 ASSESSMENT — ENCOUNTER SYMPTOMS
NEUROLOGICAL NEGATIVE: 0
ALLERGIC/IMMUNOLOGIC NEGATIVE: 0
HEMATOLOGIC/LYMPHATIC NEGATIVE: 0
CARDIOVASCULAR NEGATIVE: 0
EYES NEGATIVE: 0
ENDOCRINE NEGATIVE: 0
CONSTITUTIONAL NEGATIVE: 0
MUSCULOSKELETAL NEGATIVE: 0
PSYCHIATRIC NEGATIVE: 0
RESPIRATORY NEGATIVE: 0
GASTROINTESTINAL NEGATIVE: 0

## 2025-08-28 ASSESSMENT — EDINBURGH POSTNATAL DEPRESSION SCALE (EPDS)
I HAVE FELT SCARED OR PANICKY FOR NO GOOD REASON: YES, SOMETIMES
I HAVE BLAMED MYSELF UNNECESSARILY WHEN THINGS WENT WRONG: YES, SOME OF THE TIME
I HAVE BEEN SO UNHAPPY THAT I HAVE HAD DIFFICULTY SLEEPING: NOT VERY OFTEN
I HAVE BEEN ANXIOUS OR WORRIED FOR NO GOOD REASON: YES, SOMETIMES
THE THOUGHT OF HARMING MYSELF HAS OCCURRED TO ME: NEVER
I HAVE BEEN ABLE TO LAUGH AND SEE THE FUNNY SIDE OF THINGS: AS MUCH AS I ALWAYS COULD
TOTAL SCORE: 9
I HAVE LOOKED FORWARD WITH ENJOYMENT TO THINGS: AS MUCH AS I EVER DID
I HAVE FELT SAD OR MISERABLE: NOT VERY OFTEN
I HAVE BEEN SO UNHAPPY THAT I HAVE BEEN CRYING: NO, NEVER
THINGS HAVE BEEN GETTING ON TOP OF ME: NO, MOST OF THE TIME I HAVE COPED QUITE WELL

## 2025-08-28 ASSESSMENT — PAIN SCALES - GENERAL: PAINLEVEL_OUTOF10: 0-NO PAIN

## (undated) DEVICE — MANIFOLD, 4 PORT NEPTUNE STANDARD

## (undated) DEVICE — GLOVE, SURGICAL, PROTEXIS,  7.0, PF, LATEX

## (undated) DEVICE — PREP TRAY, SKIN, DRY, W/GLOVES

## (undated) DEVICE — Device

## (undated) DEVICE — GOWN, ASTOUND, L

## (undated) DEVICE — NEEDLE, SPINAL, 22 G X 3.5 IN, BLACK HUB

## (undated) DEVICE — SYRINGE, HYPODERMIC, CONTROL, LUER LOCK, 10 CC, PLASTIC, STERILE

## (undated) DEVICE — TOWEL, SURGICAL, NEURO, O/R, 16 X 26, BLUE, STERILE

## (undated) DEVICE — DRAPE, PAD, PREP, W/ 9 IN CUFF, 24 X 41, LF, NS

## (undated) DEVICE — TUBING, SUCTION, VACUUM, INTRAUTERINE, CURETTAGE, HANDLE, MALE ADAPTER, 6 FT X 0.375 IN

## (undated) DEVICE — REST, HEAD, BAGEL, 9 IN

## (undated) DEVICE — GOWN, SURGICAL, SMARTGOWN, XLARGE, STERILE

## (undated) DEVICE — COVER, CART, 45 X 27 X 48 IN, CLEAR